# Patient Record
Sex: FEMALE | Race: WHITE | Employment: UNEMPLOYED | URBAN - METROPOLITAN AREA
[De-identification: names, ages, dates, MRNs, and addresses within clinical notes are randomized per-mention and may not be internally consistent; named-entity substitution may affect disease eponyms.]

---

## 2018-03-02 ENCOUNTER — OFFICE VISIT (OUTPATIENT)
Dept: NEUROLOGY | Age: 58
End: 2018-03-02

## 2018-03-02 VITALS
WEIGHT: 156 LBS | HEART RATE: 73 BPM | BODY MASS INDEX: 29.45 KG/M2 | HEIGHT: 61 IN | DIASTOLIC BLOOD PRESSURE: 78 MMHG | SYSTOLIC BLOOD PRESSURE: 130 MMHG | OXYGEN SATURATION: 98 %

## 2018-03-02 DIAGNOSIS — Q07.9 CONGENITAL ENCEPHALOPATHY (HCC): ICD-10-CM

## 2018-03-02 DIAGNOSIS — G40.309 NONINTRACTABLE GENERALIZED IDIOPATHIC EPILEPSY WITHOUT STATUS EPILEPTICUS (HCC): Primary | ICD-10-CM

## 2018-03-02 RX ORDER — PRIMIDONE 50 MG/1
TABLET ORAL
Qty: 60 TAB | Refills: 5 | Status: SHIPPED | OUTPATIENT
Start: 2018-03-02 | End: 2018-09-07 | Stop reason: SDUPTHER

## 2018-03-02 RX ORDER — CARBAMAZEPINE 200 MG/1
800 TABLET ORAL DAILY
Qty: 120 TAB | Refills: 5 | Status: SHIPPED | OUTPATIENT
Start: 2018-03-02 | End: 2018-03-05 | Stop reason: ALTCHOICE

## 2018-03-02 NOTE — PROGRESS NOTES
HISTORY OF PRESENT ILLNESS  Joy Frankel is a 65 y.o. female. HPI Comments: This patient is a 60-year-old [facility] American female with epilepsy. She will have about 5-6 seizures per month. She has congenital static encephalopathy. She lives with her mother. She was previously seeing a physician in [facility] but was driving several hours to get there with her mother. The physician there said that she ought to get neurological follow-up in [place]. She has had seizures since the age of 7 months. These by history sound like atonic seizures. She is currently taking primidone 50 mg twice daily and Tegretol 200 mg 2 twice daily. She is otherwise healthy. Seizure    The history is provided by the patient and relative (Mother). This is a chronic problem. Review of Systems   Constitutional:        Review of systems is positive for constipation, cough, falls, fatigue, snoring and poor vision. Complete review of systems done all others negative     Current Outpatient Prescriptions on File Prior to Visit   Medication Sig Dispense Refill    primidone (MYSOLINE) 250 mg tablet Take 250 mg by mouth three (3) times daily.  ibuprofen (MOTRIN) 600 mg tablet Take 1 Tab by mouth every six (6) hours as needed for Pain. 40 Tab 0    HYDROcodone-acetaminophen (NORCO) 5-325 mg per tablet Take 1 Tab by mouth every four (4) hours as needed for Pain. Max Daily Amount: 6 Tabs. 20 Tab 0     No current facility-administered medications on file prior to visit.       Past Medical History:   Diagnosis Date    Seizures ([facility] [address]      Family History   Problem Relation Age of Onset    No Known Problems Mother     No Known Problems Father         Social History   Substance Use Topics    Smoking status: Never Smoker    Smokeless tobacco: Never Used    Alcohol use No        /78  Pulse 73  Ht 5' 1\" (1.549 m)  Wt 156 lb (70.8 kg)  SpO2 98%  BMI 29.48 kg/m2    Physical Exam  Constitutional: Oriented to person, place, and time, appears well-developed and well-nourished. No distress. HENT:   Head: Normocephalic and atraumatic. Mouth/Throat: Oropharynx is clear and moist. No oropharyngeal exudate. Eyes: Conjunctivae and EOM are normal. Pupils are equal, round, and reactive to light. No scleral icterus. Neck: Normal range of motion. Neck supple. No thyromegaly present. Cardiovascular: Normal rate, regular rhythm and normal heart sounds. Musculoskeletal: Normal range of motion, exhibits no edema, tenderness or deformity. Lymphadenopathy: no cervical adenopathy. Neurological: Alert and oriented to person   Normal strength and normal reflexes. Displays no atrophy and no tremor. No cranial nerve deficit or sensory deficit. Exhibits normal muscle tone. Displays a negative Romberg sign, no seizure activity. Coordination normal, gait normal.   No Babinski's sign on the right side. No Babinski's sign on the left side. Speech is sparse but normal.  Visual fields are full to confrontation, funduscopic exam reveals flat discs, the retina and vasculature are normal   Skin: Skin is warm and dry. No rash noted, not diaphoretic. No erythema. Psychiatric: Normal mood and affect,  behavior is normal.  Vitals reviewed. ASSESSMENT and PLAN  EPILEPSY  I will not alter her medications at this time. She has more seizures per month and I am comfortable with but apparently the mother is comfortable with them. I will check a primidone level and Tegretol level today. I refilled her medications. I will make a follow-up appointment for 6 months but I asked the mother to call us in 3 days so that we make her adjust her medication as needed. CONGENITAL STATIC ENCEPHALOPATHY  Stable, no investigation required. This note will not be viewable in [address] This note was created using voice recognition software. Despite editing, there may be syntax errors.

## 2018-03-02 NOTE — MR AVS SNAPSHOT
[address], 
[address], [address] [facility] 
[phone] Patient: Pinky Ledesma MRN: [id number] LTL:[id number] Visit Information Date & Time Provider Department Dept. Phone Encounter #  
 3/2/2018  3:00 PM Geri Grissom MD Neurology Clinic at [facility] [phone] [id number] Follow-up Instructions Return in about 6 months (around 9/2/2018). Your Appointments 3/2/2018  3:00 PM  
New Patient with Geri Grissom MD  
[facility] Appt Note: NP Appointment, Seizures, Referred by Dr. Karena Overton [phone], AB, 02/14/18  
 [address] [place] [address], 
[address], [address] [address]  
[address],  [address] [address]  
  
    
 9/7/2018  2:40 PM  
Follow Up with Geri Grissom MD  
Neurology Clinic at [facility]) Appt Note: follow up seizures $ 0 cp jll 3/2/18  
 [address] [place] [address], [address] [address]  
[address], [address] [place] [address] [address] [address] Upcoming Health Maintenance Date Due Hepatitis C Screening 1960 DTaP/Tdap/Td series (1 - Tdap) 4/24/1982 PAP AKA CERVICAL CYTOLOGY 4/24/1982 BREAST CANCER SCRN MAMMOGRAM 4/24/2011 FOBT Q 1 YEAR AGE 50-75 4/24/2011 Influenza Age 5 to Adult 8/1/2017 Allergies as of 3/2/2018  Review Complete On: 3/2/2018 By: Caitlin Darling LPN No Known Allergies Current Immunizations  Never Reviewed No immunizations on file. Not reviewed this visit You Were Diagnosed With   
  
 Codes Comments Nonintractable generalized idiopathic epilepsy without status epilepticus ([facility]    -  Primary ICD-10-CM: E28.591 ICD-9-CM: 345.90 Vitals BP Pulse Height(growth percentile) Weight(growth percentile) SpO2 BMI  
 130/78 73 5' 1\" (1.549 m) 156 lb (70.8 kg) 98% 29.48 kg/m2 OB Status Smoking Status Postmenopausal Never Smoker Vitals History BMI and BSA Data Body Mass Index Body Surface Area  
 29.48 kg/m 2 1.75 m 2 Preferred Pharmacy Pharmacy Name Phone [id number] [address] [facility] [phone] Your Updated Medication List  
  
   
This list is accurate as of 3/2/18  2:13 PM.  Always use your most recent med list.  
  
  
  
  
 carBAMazepine 200 mg tablet Commonly known as:  TEGretol Take 4 Tabs by mouth daily. Indications: MIXED EPILEPSY HYDROcodone-acetaminophen 5-325 mg per tablet Commonly known as:  Kay Zarate Take 1 Tab by mouth every four (4) hours as needed for Pain. Max Daily Amount: 6 Tabs. ibuprofen 600 mg tablet Commonly known as:  MOTRIN Take 1 Tab by mouth every six (6) hours as needed for Pain. * primidone 250 mg tablet Commonly known as: MYSOLINE Take 250 mg by mouth three (3) times daily. * primidone 50 mg tablet Commonly known as: MYSOLINE  
1 twice daily  Indications: epilepsy * Notice: This list has 2 medication(s) that are the same as other medications prescribed for you. Read the directions carefully, and ask your doctor or other care provider to review them with you. Prescriptions Sent to Pharmacy Refills  
 carBAMazepine (TEGRETOL) 200 mg tablet 5 Sig: Take 4 Tabs by mouth daily. Indications: MIXED EPILEPSY Class: Normal  
 Pharmacy: [address] [place] [address] Ph #: [phone] Route: Oral  
 primidone (MYSOLINE) 50 mg tablet 5 Si twice daily  Indications: epilepsy Class: Normal  
 Pharmacy: [address] [place] [address] Ph #: [phone] We Performed the Following CARBAMAZEPINE [id number] CPT(R)] PRIMIDONE (MYSOLINE) [id number] CPT(R)] Follow-up Instructions Return in about 6 months (around 9/2/2018). Patient Instructions PRESCRIPTION REFILL POLICY [facility] Statement to Patients April 1, 2014 In an effort to ensure the large volume of patient prescription refills is processed in the most efficient and expeditious manner, we are asking our patients to assist us by calling your Pharmacy for all prescription refills, this will include also your  Mail Order Pharmacy. The pharmacy will contact our office electronically to continue the refill process. Please do not wait until the last minute to call your pharmacy. We need at least 48 hours (2days) to fill prescriptions. We also encourage you to call your pharmacy before going to  your prescription to make sure it is ready. With regard to controlled substance prescription refill requests (narcotic refills) that need to be picked up at our office, we ask your cooperation by providing us with at least 72 hours (3days) notice that you will need a refill. We will not refill narcotic prescription refill requests after 4:00pm on any weekday, Monday through Thursday, or after 2:00pm on Fridays, or on the weekends. We encourage everyone to explore another way of getting your prescription refill request processed using [employer], our patient web portal through our electronic medical record system. [employer] is an efficient and effective way to communicate your medication request directly to the office and  downloadable as an frank on your smart phone . [employer] also features a review functionality that allows you to view your medication list as well as leave messages for your physician. Are you ready to get connected? If so please review the attatched instructions or speak to any of our staff to get you set up right away! Thank you so much for your cooperation. Should you have any questions please contact our Practice Administrator. The Physicians and Staff,  [facility] Introducing hospitals & HEALTH SERVICES! [facility] introduces MOO.COM patient portal. Now you can access parts of your medical record, email your doctor's office, and request medication refills online. 1. In your internet browser, go to [email or web address] 2. Click on the First Time User? Click Here link in the Sign In box. You will see the New Member Sign Up page. 3. Enter your [employer] Access Code exactly as it appears below. You will not need to use this code after youve completed the sign-up process. If you do not sign up before the expiration date, you must request a new code. · MOO.COM Access Code: [email or web address] Expires: 5/31/2018  1:25 PM 
 
4. Enter the last four digits of your Social Security Number (xxxx) and Date of Birth (mm/dd/yyyy) as indicated and click Submit. You will be taken to the next sign-up page. 5. Create a MOO.COM ID. This will be your MOO.COM login ID and cannot be changed, so think of one that is secure and easy to remember. 6. Create a MOO.COM password. You can change your password at any time. 7. Enter your Password Reset Question and Answer. This can be used at a later time if you forget your password. 8. Enter your e-mail address. You will receive e-mail notification when new information is available in [address] 9. Click Sign Up. You can now view and download portions of your medical record. 10. Click the Download Summary menu link to download a portable copy of your medical information. If you have questions, please visit the Frequently Asked Questions section of the MOO.COM website. Remember, MOO.COM is NOT to be used for urgent needs. For medical emergencies, dial 911. Now available from your iPhone and Android! Please provide this summary of care documentation to your next provider. Your primary care clinician is listed as Lynnette Peguero. If you have any questions after today's visit, please call [phone].

## 2018-03-02 NOTE — PATIENT INSTRUCTIONS
[facility]   Statement to Patients  April 1, 2014      In an effort to ensure the large volume of patient prescription refills is processed in the most efficient and expeditious manner, we are asking our patients to assist us by calling your Pharmacy for all prescription refills, this will include also your  Mail Order Pharmacy. The pharmacy will contact our office electronically to continue the refill process. Please do not wait until the last minute to call your pharmacy. We need at least 48 hours (2days) to fill prescriptions. We also encourage you to call your pharmacy before going to  your prescription to make sure it is ready. With regard to controlled substance prescription refill requests (narcotic refills) that need to be picked up at our office, we ask your cooperation by providing us with at least 72 hours (3days) notice that you will need a refill. We will not refill narcotic prescription refill requests after 4:00pm on any weekday, Monday through Thursday, or after 2:00pm on Fridays, or on the weekends. We encourage everyone to explore another way of getting your prescription refill request processed using [employer], our patient web portal through our electronic medical record system. [employer] is an efficient and effective way to communicate your medication request directly to the office and  downloadable as an frank on your smart phone . [employer] also features a review functionality that allows you to view your medication list as well as leave messages for your physician. Are you ready to get connected? If so please review the attatched instructions or speak to any of our staff to get you set up right away! Thank you so much for your cooperation. Should you have any questions please contact our Practice Administrator.     The Physicians and Staff,  [facility]

## 2018-03-02 NOTE — LETTER
3/2/2018 3:27 PM 
 
Patient:  Nacho Dasilva YOB: 1960 Date of Visit: 3/2/2018 Dear Estrellita Boston MD 
[address] [facility] [address] [address] VIA Facsimile: [phone] 
 : Thank you for referring Ms. Nacho Dasilva to me for evaluation/treatment. Below are the relevant portions of my assessment and plan of care. HISTORY OF PRESENT ILLNESS Nacho Dasilva is a 65 y.o. female. HPI Comments: This patient is a 27-year-old [facility] American female with epilepsy. She will have about 5-6 seizures per month. She has congenital static encephalopathy. She lives with her mother. She was previously seeing a physician in [facility] but was driving several hours to get there with her mother. The physician there said that she ought to get neurological follow-up in [place]. She has had seizures since the age of 7 months. These by history sound like atonic seizures. She is currently taking primidone 50 mg twice daily and Tegretol 200 mg 2 twice daily. She is otherwise healthy. Seizure The history is provided by the patient and relative (Mother). This is a chronic problem. Review of Systems Constitutional:  
     Review of systems is positive for constipation, cough, falls, fatigue, snoring and poor vision. Complete review of systems done all others negative Current Outpatient Prescriptions on File Prior to Visit Medication Sig Dispense Refill  primidone (MYSOLINE) 250 mg tablet Take 250 mg by mouth three (3) times daily.  ibuprofen (MOTRIN) 600 mg tablet Take 1 Tab by mouth every six (6) hours as needed for Pain. 40 Tab 0  
 HYDROcodone-acetaminophen (NORCO) 5-325 mg per tablet Take 1 Tab by mouth every four (4) hours as needed for Pain. Max Daily Amount: 6 Tabs. 20 Tab 0 No current facility-administered medications on file prior to visit. Past Medical History:  
Diagnosis Date  Seizures ([address] Family History Problem Relation Age of Onset  No Known Problems Mother  No Known Problems Father Social History Substance Use Topics  Smoking status: Never Smoker  Smokeless tobacco: Never Used  Alcohol use No  
 
 
 /78  Pulse 73  Ht 5' 1\" (1.549 m)  Wt 156 lb (70.8 kg)  SpO2 98%  BMI 29.48 kg/m2 Physical Exam 
Constitutional: Oriented to person, place, and time, appears well-developed and well-nourished. No distress. HENT:  
Head: Normocephalic and atraumatic. Mouth/Throat: Oropharynx is clear and moist. No oropharyngeal exudate. Eyes: Conjunctivae and EOM are normal. Pupils are equal, round, and reactive to light. No scleral icterus. Neck: Normal range of motion. Neck supple. No thyromegaly present. Cardiovascular: Normal rate, regular rhythm and normal heart sounds. Musculoskeletal: Normal range of motion, exhibits no edema, tenderness or deformity. Lymphadenopathy: no cervical adenopathy. Neurological: Alert and oriented to person Normal strength and normal reflexes. Displays no atrophy and no tremor. No cranial nerve deficit or sensory deficit. Exhibits normal muscle tone. Displays a negative Romberg sign, no seizure activity. Coordination normal, gait normal.  
No Babinski's sign on the right side. No Babinski's sign on the left side. Speech is sparse but normal. 
Visual fields are full to confrontation, funduscopic exam reveals flat discs, the retina and vasculature are normal  
Skin: Skin is warm and dry. No rash noted, not diaphoretic. No erythema. Psychiatric: Normal mood and affect,  behavior is normal. 
Vitals reviewed. ASSESSMENT and PLAN 
EPILEPSY I will not alter her medications at this time. She has more seizures per month and I am comfortable with but apparently the mother is comfortable with them. I will check a primidone level and Tegretol level today. I refilled her medications.   I will make a follow-up appointment for 6 months but I asked the mother to call us in 3 days so that we make her adjust her medication as needed. CONGENITAL STATIC ENCEPHALOPATHY Stable, no investigation required. This note will not be viewable in [address] This note was created using voice recognition software. Despite editing, there may be syntax errors. If you have questions, please do not hesitate to call me. I look forward to following Ms. Maulik Ramirezcharanjit along with you. Sincerely, Lisa Ko MD

## 2018-03-05 ENCOUNTER — TELEPHONE (OUTPATIENT)
Dept: NEUROLOGY | Age: 58
End: 2018-03-05

## 2018-03-05 RX ORDER — CARBAMAZEPINE 200 MG/1
200 TABLET ORAL 3 TIMES DAILY
Status: CANCELLED | OUTPATIENT
Start: 2018-03-05

## 2018-03-05 RX ORDER — CARBAMAZEPINE 200 MG/1
200 TABLET ORAL 3 TIMES DAILY
Qty: 120 TAB | Refills: 5 | Status: SHIPPED | OUTPATIENT
Start: 2018-03-05 | End: 2018-11-28 | Stop reason: SDUPTHER

## 2018-03-05 NOTE — TELEPHONE ENCOUNTER
----- Message from Jessica Witt MD sent at 3/5/2018 10:43 AM EST -----  Excellent level, continue current dose

## 2018-03-06 LAB
CARBAMAZEPINE SERPL-MCNC: 8.6 UG/ML (ref 4–12)
PHENOBARB SERPL-MCNC: 9 UG/ML (ref 15–40)
PRIMIDONE SERPL-MCNC: 5.4 UG/ML (ref 5–12)

## 2018-03-07 NOTE — PROGRESS NOTES
Can you call this patient's mother and tell her that the primidone level is a bit low and if we want to try for better seizure control we can increase her primidone some. There is a small chance she would get sleepy on the increased dose but I think it is worth a try. If she is willing we would increase the primidone to 1-1/2 tablets twice a day, she is taking 1 tablet twice a day now of the 50 mg.   If she wishes to go ahead with this let me know and I will alter the prescription

## 2018-05-17 ENCOUNTER — HOSPITAL ENCOUNTER (OUTPATIENT)
Dept: CT IMAGING | Age: 58
Discharge: HOME | End: 2018-05-17
Attending: UROLOGY
Payer: MEDICARE

## 2018-05-17 DIAGNOSIS — R31.21 ASYMPTOMATIC MICROSCOPIC HEMATURIA: ICD-10-CM

## 2018-05-17 PROCEDURE — 74011250636 HC RX REV CODE- 250/636: Performed by: UROLOGY

## 2018-05-17 PROCEDURE — 74011636320 HC RX REV CODE- 636/320: Performed by: UROLOGY

## 2018-05-17 PROCEDURE — 74178 CT ABD&PLV WO CNTR FLWD CNTR: CPT

## 2018-05-17 RX ORDER — SODIUM CHLORIDE 9 MG/ML
50 INJECTION, SOLUTION INTRAVENOUS
Status: COMPLETED | OUTPATIENT
Start: 2018-05-17 | End: 2018-05-17

## 2018-05-17 RX ORDER — SODIUM CHLORIDE 0.9 % (FLUSH) 0.9 %
10 SYRINGE (ML) INJECTION
Status: COMPLETED | OUTPATIENT
Start: 2018-05-17 | End: 2018-05-17

## 2018-05-17 RX ADMIN — Medication 10 ML: at 15:50

## 2018-05-17 RX ADMIN — IOPAMIDOL 100 ML: 755 INJECTION, SOLUTION INTRAVENOUS at 15:51

## 2018-05-17 RX ADMIN — SODIUM CHLORIDE 50 ML/HR: 900 INJECTION, SOLUTION INTRAVENOUS at 15:51

## 2018-08-03 RX ORDER — PRIMIDONE 250 MG/1
TABLET ORAL
Qty: 90 TAB | Refills: 10 | Status: SHIPPED | OUTPATIENT
Start: 2018-08-03 | End: 2018-09-07 | Stop reason: SDUPTHER

## 2018-09-07 ENCOUNTER — OFFICE VISIT (OUTPATIENT)
Dept: NEUROLOGY | Age: 58
End: 2018-09-07

## 2018-09-07 VITALS
BODY MASS INDEX: 29.45 KG/M2 | DIASTOLIC BLOOD PRESSURE: 80 MMHG | HEART RATE: 82 BPM | HEIGHT: 61 IN | SYSTOLIC BLOOD PRESSURE: 126 MMHG | WEIGHT: 156 LBS

## 2018-09-07 DIAGNOSIS — G40.309 NONINTRACTABLE GENERALIZED IDIOPATHIC EPILEPSY WITHOUT STATUS EPILEPTICUS (HCC): Primary | ICD-10-CM

## 2018-09-07 DIAGNOSIS — Q07.9 CONGENITAL ENCEPHALOPATHY (HCC): ICD-10-CM

## 2018-09-07 PROBLEM — G40.009 PARTIAL IDIOPATHIC EPILEPSY WITH SEIZURES OF LOCALIZED ONSET, NOT INTRACTABLE, WITHOUT STATUS EPILEPTICUS (HCC): Status: ACTIVE | Noted: 2018-09-07

## 2018-09-07 RX ORDER — PRIMIDONE 250 MG/1
TABLET ORAL
Qty: 90 TAB | Refills: 10 | Status: SHIPPED | OUTPATIENT
Start: 2018-09-07 | End: 2019-05-03 | Stop reason: SDUPTHER

## 2018-09-07 RX ORDER — PRIMIDONE 50 MG/1
TABLET ORAL
Qty: 90 TAB | Refills: 5 | Status: SHIPPED | OUTPATIENT
Start: 2018-09-07 | End: 2019-05-03 | Stop reason: SDUPTHER

## 2018-09-07 NOTE — MR AVS SNAPSHOT
[address] [facility], 
[address], [address] [facility] 
[phone] Patient: Alfredito Garsia MRN: [id number] Visit Information Date & Time Provider Department Dept. Phone Encounter #  
 2018  2:40 PM Estela Rudd MD Neurology [facility] at [facility] [phone] [id number] Follow-up Instructions Return in about 6 months (around 3/7/2019). Your Appointments 3/8/2019  1:40 PM  
Follow Up with Estela Rudd MD  
Neurology Clinic at [facility] Appt Note: follow up 4 0 CP jll 18  
 [address] [place] [address], 
[address], [phone] [phone]  
[address] [facility], [address] [facility], [address] [phone] Upcoming Health Maintenance Date Due Hepatitis C Screening 1960 DTaP/Tdap/Td series (1 - Tdap) 1982 PAP AKA CERVICAL CYTOLOGY 1982 BREAST CANCER SCRN MAMMOGRAM 2011 FOBT Q 1 YEAR AGE 50-75 2011 MEDICARE YEARLY EXAM 3/14/2018 Influenza Age 5 to Adult 2018 Allergies as of 2018  Review Complete On: 2018 By: Chon Thorpe LPN No Known Allergies Current Immunizations  Never Reviewed No immunizations on file. Not reviewed this visit You Were Diagnosed With   
  
 Codes Comments Nonintractable generalized idiopathic epilepsy without status epilepticus ([facility]    -  Primary ICD-10-CM: F49.433 ICD-9-CM: 345.90 Vitals BP Pulse Height(growth percentile) Weight(growth percentile) BMI OB Status 126/80 82 5' 1\" (1.549 m) 156 lb (70.8 kg) 29.48 kg/m2 Postmenopausal  
 Smoking Status Never Smoker Vitals History BMI and BSA Data Body Mass Index Body Surface Area  
 29.48 kg/m 2 1.75 m 2 Preferred Pharmacy Pharmacy Name Phone [id number] [place], [address] [phone] Your Updated Medication List  
  
   
This list is accurate as of 18  3:01 PM.  Always use your most recent med list.  
  
  
  
  
 carBAMazepine 200 mg tablet Commonly known as:  TEGretol Take 1 Tab by mouth three (3) times daily. Indications: Brand Tegretol only HYDROcodone-acetaminophen 5-325 mg per tablet Commonly known as:  Benetta Pock Take 1 Tab by mouth every four (4) hours as needed for Pain. Max Daily Amount: 6 Tabs. ibuprofen 600 mg tablet Commonly known as:  MOTRIN Take 1 Tab by mouth every six (6) hours as needed for Pain. * primidone 250 mg tablet Commonly known as: MYSOLINE  
TAKE 1 TABLET BY MOUTH 3 TIMES A DAY  Indications: epilepsy * primidone 50 mg tablet Commonly known as: MYSOLINE  
1 twice daily  Indications: epilepsy * Notice: This list has 2 medication(s) that are the same as other medications prescribed for you. Read the directions carefully, and ask your doctor or other care provider to review them with you. Prescriptions Sent to Pharmacy Refills  
 primidone (MYSOLINE) 250 mg tablet 10 Sig: TAKE 1 TABLET BY MOUTH 3 TIMES A DAY  Indications: epilepsy Class: Normal  
 Pharmacy: [address] [place] [address] Ph #: [phone]  
 primidone (MYSOLINE) 50 mg tablet 5 Si twice daily  Indications: epilepsy Class: Normal  
 Pharmacy: [address] [place] [address] Ph #: [phone] We Performed the Following PRIMIDONE (MYSOLINE) [id number] CPT(R)] Follow-up Instructions Return in about 6 months (around 3/7/2019). Patient Instructions Office Policies 
o Phone calls/patient messages: Please allow up to 24 hours for someone in the office to contact you about your call or message.  Be mindful your provider may be out of the office or your message may require further review. We encourage you to use [employer] for your messages as this is a faster, more efficient way to communicate with our office 
o Medication Refills: 
Prescription medications require up to 48 business hours to process. We encourage you to use [employer] for your refills. For controlled medications: Please allow up to 72 business hours to process. Certain medications may require you to  a written prescription at our office. NO narcotic/controlled medications will be prescribed after 4pm Monday through Friday or on weekends 
o Form/Paperwork Completion: 
Please note there is a $25 fee for all paperwork completed by our providers. We ask that you allow 7-14 business days. Pre-payment is due prior to picking up/faxing the completed form. You may also download your forms to [employer] to have your doctor print off. 
o Test Results: In order for a patient to obtain test results, an appointment must be made with the physician to review the results. Test results cannot be discussed over the phone. Introducing [facility]! [facility] introduces [employer] patient portal. Now you can access parts of your medical record, email your doctor's office, and request medication refills online. 1. In your internet browser, go to [email or web address] 2. Click on the First Time User? Click Here link in the Sign In box. You will see the New Member Sign Up page. 3. Enter your [employer] Access Code exactly as it appears below. You will not need to use this code after youve completed the sign-up process. If you do not sign up before the expiration date, you must request a new code. · [employer] Access Code: [email or web address] Expires: 12/6/2018  3:01 PM 
 
4. Enter the last four digits of your Social Security Number (xxxx) and Date of Birth (mm/dd/yyyy) as indicated and click Submit. You will be taken to the next sign-up page. 5. Create a [employer]XTM ID. This will be your [employer] login ID and cannot be changed, so think of one that is secure and easy to remember. 6. Create a [employer] password. You can change your password at any time. 7. Enter your Password Reset Question and Answer. This can be used at a later time if you forget your password. 8. Enter your e-mail address. You will receive e-mail notification when new information is available in [address] 9. Click Sign Up. You can now view and download portions of your medical record. 10. Click the Download Summary menu link to download a portable copy of your medical information. If you have questions, please visit the Frequently Asked Questions section of the [employer] website. Remember, [employer] is NOT to be used for urgent needs. For medical emergencies, dial 911. Now available from your iPhone and Android! Please provide this summary of care documentation to your next provider. Your primary care clinician is listed as Divine Castro. If you have any questions after today's visit, please call [phone].

## 2018-09-07 NOTE — PROGRESS NOTES
HISTORY OF PRESENT ILLNESS  Roberta Doss is a 63 y.o. female. HPI Comments: This patient is a 73-year-old [facility] American female with epilepsy. She will have about 5-6 seizures per month. She has congenital static encephalopathy. She lives with her mother. She was previously seeing a physician in [facility] but was driving several hours to get there with her mother. The physician there said that she ought to get neurological follow-up in [place]. She has had seizures since the age of 7 months. These by history sound like atonic seizures. She is currently taking primidone 50 mg twice daily and Tegretol 200 mg 2 twice daily. She is otherwise healthy. Her mother says that she is doing well since we increased the phenobarbital.  She is currently taking twice a day as noted above. .  The mother says she still several times a month will have 1 of her seizures which often consists of contortions of the upper body with the arms rising and the head turning and noise. Patient does not generalized and does not lose consciousness. Review of Systems   Constitutional:        Review of systems is positive for constipation, cough, falls, fatigue, snoring and poor vision. Complete review of systems done all others negative     Current Outpatient Prescriptions on File Prior to Visit   Medication Sig Dispense Refill    carBAMazepine (TEGRETOL) 200 mg tablet Take 1 Tab by mouth three (3) times daily. Indications: Brand Tegretol only 120 Tab 5    ibuprofen (MOTRIN) 600 mg tablet Take 1 Tab by mouth every six (6) hours as needed for Pain. 40 Tab 0    HYDROcodone-acetaminophen (NORCO) 5-325 mg per tablet Take 1 Tab by mouth every four (4) hours as needed for Pain. Max Daily Amount: 6 Tabs. 20 Tab 0     No current facility-administered medications on file prior to visit.       Past Medical History:   Diagnosis Date    Seizures ([facility]      Family History   Problem Relation Age of Onset    No Known Problems Mother     No Known Problems Father         Social History   Substance Use Topics    Smoking status: Never Smoker    Smokeless tobacco: Never Used    Alcohol use No        /80  Pulse 82  Ht 5' 1\" (1.549 m)  Wt 156 lb (70.8 kg)  BMI 29.48 kg/m2    Physical Exam  Constitutional: Oriented to person, place,   Neurological: Alert and oriented to person   Displays no atrophy and no tremor. Exhibits normal muscle tone. Displays a negative Romberg sign, no seizure activity. Coordination normal, gait normal.   Vitals reviewed. 3/6/2018  4:38 PM - Alfonso, [facility] Lab Results In   Component Results   Component Value Flag Ref Range Units Status   Primidone, Serum 5.4  5.0 - 12.0 ug/mL Final   Comment:                                   Detection Limit = 0.3                            <0.3 indicates None Detected      Phenobarbital 9 (L) 15 - 40 ug/mL Final   Comment:                                   Detection Limit = 3     3/6/2018  4:38 PM - Alfonso, [facility] Lab Results In   Component Results   Component Value Flag Ref Range Units Status   Carbamazepine 8.6  4.0 - 12.0 ug/mL Final       ASSESSMENT and PLAN  EPILEPSY  Phenobarbital is still little bit low. She is taking primidone 250 mg 3 times daily and primidone 50 mg 3 times daily. The levels above represent when she was taking the 250 3 times daily and the 50 twice daily. That was 2 weeks ago. I will check levels again today, hopefully her barbital level will be in the therapeutic range, if not I will continue to increase until it is. I will see her back in 6 months. CONGENITAL STATIC ENCEPHALOPATHY  Stable, no investigation required.

## 2018-09-07 NOTE — PATIENT INSTRUCTIONS
Office Policies  o Phone calls/patient messages:  Please allow up to 24 hours for someone in the office to contact you about your call or message. Be mindful your provider may be out of the office or your message may require further review. We encourage you to use [employer] for your messages as this is a faster, more efficient way to communicate with our office  o Medication Refills:  Prescription medications require up to 48 business hours to process. We encourage you to use [employer] for your refills. For controlled medications: Please allow up to 72 business hours to process. Certain medications may require you to  a written prescription at our office. NO narcotic/controlled medications will be prescribed after 4pm Monday through Friday or on weekends  o Form/Paperwork Completion:  Please note there is a $25 fee for all paperwork completed by our providers. We ask that you allow 7-14 business days. Pre-payment is due prior to picking up/faxing the completed form. You may also download your forms to [employer] to have your doctor print off.  o Test Results: In order for a patient to obtain test results, an appointment must be made with the physician to review the results. Test results cannot be discussed over the phone.

## 2018-09-11 LAB
PHENOBARB SERPL-MCNC: 30 UG/ML (ref 15–40)
PRIMIDONE SERPL-MCNC: 12.2 UG/ML (ref 5–12)

## 2018-11-28 ENCOUNTER — TELEPHONE (OUTPATIENT)
Dept: NEUROLOGY | Age: 58
End: 2018-11-28

## 2018-11-28 RX ORDER — CARBAMAZEPINE 200 MG/1
200 TABLET ORAL 3 TIMES DAILY
Qty: 120 TAB | Refills: 5 | Status: SHIPPED | OUTPATIENT
Start: 2018-11-28 | End: 2018-11-29 | Stop reason: SDUPTHER

## 2018-11-28 NOTE — TELEPHONE ENCOUNTER
Received a call from the pharmacy they stated the patient cannot take the generic of the carBAMazepine (TEGRETOL) 200 mg tablet [[id number]]   Order Details       Wanted to know if the medication can be re sent indicating brand only

## 2018-11-29 RX ORDER — CARBAMAZEPINE 200 MG/1
200 TABLET ORAL 3 TIMES DAILY
Qty: 270 TAB | Refills: 3 | Status: SHIPPED | OUTPATIENT
Start: 2018-11-29 | End: 2019-03-25

## 2019-03-20 ENCOUNTER — TELEPHONE (OUTPATIENT)
Dept: NEUROLOGY | Age: 59
End: 2019-03-20

## 2019-03-25 ENCOUNTER — TELEPHONE (OUTPATIENT)
Dept: NEUROLOGY | Age: 59
End: 2019-03-25

## 2019-03-25 RX ORDER — CARBAMAZEPINE 200 MG/1
TABLET ORAL
Qty: 540 TAB | Refills: 3 | Status: SHIPPED | OUTPATIENT
Start: 2019-03-25 | End: 2019-04-04

## 2019-03-25 NOTE — TELEPHONE ENCOUNTER
Note      Spoke to sister and patient had started having seizures again after being changed to carbamazepine 200 mg take 1 tab three times daily   Patient went back to Carbamazepine 200 mg take 2 tabs three times daily  Dr. Jose Sewell please write new script with directions   And d/c old script   Thanks.

## 2019-03-25 NOTE — TELEPHONE ENCOUNTER
Called in regards to the medication and needs to know how much to take/ She is having seizures again.

## 2019-04-02 ENCOUNTER — TELEPHONE (OUTPATIENT)
Dept: NEUROLOGY | Age: 59
End: 2019-04-02

## 2019-04-04 ENCOUNTER — TELEPHONE (OUTPATIENT)
Dept: NEUROLOGY | Age: 59
End: 2019-04-04

## 2019-04-04 RX ORDER — CARBAMAZEPINE 200 MG/1
TABLET ORAL
Qty: 540 TAB | Refills: 3 | Status: SHIPPED | OUTPATIENT
Start: 2019-04-04 | End: 2019-05-03 | Stop reason: SDUPTHER

## 2019-04-04 NOTE — TELEPHONE ENCOUNTER
Re: Tegretol (brand medically necessary)     Qty requested: 540/90 days     Sent to [employer] [facility] via [employer]. Status is pending.   Key: [id number] - [id number]

## 2019-04-04 NOTE — TELEPHONE ENCOUNTER
Tegretol approval  From [facility] ID [id number] 3 1/19 - 4/3/20. Faxed to [facility] on [address].

## 2019-05-03 ENCOUNTER — OFFICE VISIT (OUTPATIENT)
Dept: NEUROLOGY | Age: 59
End: 2019-05-03

## 2019-05-03 VITALS
OXYGEN SATURATION: 98 % | WEIGHT: 159 LBS | HEIGHT: 61 IN | HEART RATE: 77 BPM | BODY MASS INDEX: 30.02 KG/M2 | SYSTOLIC BLOOD PRESSURE: 128 MMHG | DIASTOLIC BLOOD PRESSURE: 80 MMHG

## 2019-05-03 DIAGNOSIS — G40.309 NONINTRACTABLE GENERALIZED IDIOPATHIC EPILEPSY WITHOUT STATUS EPILEPTICUS (HCC): Primary | ICD-10-CM

## 2019-05-03 RX ORDER — CARBAMAZEPINE 200 MG/1
TABLET ORAL
Qty: 540 TAB | Refills: 3 | Status: SHIPPED | OUTPATIENT
Start: 2019-05-03 | End: 2020-04-22 | Stop reason: SDUPTHER

## 2019-05-03 RX ORDER — PRIMIDONE 50 MG/1
TABLET ORAL
Qty: 90 TAB | Refills: 5 | Status: SHIPPED | OUTPATIENT
Start: 2019-05-03 | End: 2020-02-03

## 2019-05-03 RX ORDER — PRIMIDONE 250 MG/1
TABLET ORAL
Qty: 90 TAB | Refills: 10 | Status: SHIPPED | OUTPATIENT
Start: 2019-05-03 | End: 2020-02-03 | Stop reason: SDUPTHER

## 2019-05-03 NOTE — PATIENT INSTRUCTIONS
A Healthy Lifestyle: Care Instructions  Your Care Instructions    A healthy lifestyle can help you feel good, stay at a healthy weight, and have plenty of energy for both work and play. A healthy lifestyle is something you can share with your whole family. A healthy lifestyle also can lower your risk for serious health problems, such as high blood pressure, heart disease, and diabetes. You can follow a few steps listed below to improve your health and the health of your family. Follow-up care is a key part of your treatment and safety. Be sure to make and go to all appointments, and call your doctor if you are having problems. It's also a good idea to know your test results and keep a list of the medicines you take. How can you care for yourself at home? · Do not eat too much sugar, fat, or fast foods. You can still have dessert and treats now and then. The goal is moderation. · Start small to improve your eating habits. Pay attention to portion sizes, drink less juice and soda pop, and eat more fruits and vegetables. ? Eat a healthy amount of food. A 3-ounce serving of meat, for example, is about the size of a deck of cards. Fill the rest of your plate with vegetables and whole grains. ? Limit the amount of soda and sports drinks you have every day. Drink more water when you are thirsty. ? Eat at least 5 servings of fruits and vegetables every day. It may seem like a lot, but it is not hard to reach this goal. A serving or helping is 1 piece of fruit, 1 cup of vegetables, or 2 cups of leafy, raw vegetables. Have an apple or some carrot sticks as an afternoon snack instead of a candy bar. Try to have fruits and/or vegetables at every meal.  · Make exercise part of your daily routine. You may want to start with simple activities, such as walking, bicycling, or slow swimming. Try to be active 30 to 60 minutes every day. You do not need to do all 30 to 60 minutes all at once.  For example, you can exercise 3 times a day for 10 or 20 minutes. Moderate exercise is safe for most people, but it is always a good idea to talk to your doctor before starting an exercise program.  · Keep moving. Carrie Mcdaniele the lawn, work in the garden, or [employer]. Take the stairs instead of the elevator at work. · If you smoke, quit. People who smoke have an increased risk for heart attack, stroke, cancer, and other lung illnesses. Quitting is hard, but there are ways to boost your chance of quitting tobacco for good. ? Use nicotine gum, patches, or lozenges. ? Ask your doctor about stop-smoking programs and medicines. ? Keep trying. In addition to reducing your risk of diseases in the future, you will notice some benefits soon after you stop using tobacco. If you have shortness of breath or asthma symptoms, they will likely get better within a few weeks after you quit. · Limit how much alcohol you drink. Moderate amounts of alcohol (up to 2 drinks a day for men, 1 drink a day for women) are okay. But drinking too much can lead to liver problems, high blood pressure, and other health problems. Family health  If you have a family, there are many things you can do together to improve your health. · Eat meals together as a family as often as possible. · Eat healthy foods. This includes fruits, vegetables, lean meats and dairy, and whole grains. · Include your family in your fitness plan. Most people think of activities such as jogging or tennis as the way to fitness, but there are many ways you and your family can be more active. Anything that makes you breathe hard and gets your heart pumping is exercise. Here are some tips:  ? Walk to do errands or to take your child to school or the bus.  ? Go for a family bike ride after dinner instead of watching TV. Where can you learn more? Go to http://augustina-elise.info/. Enter U725 in the search box to learn more about \"A Healthy Lifestyle: Care Instructions. \"  Current as of: September 11, 2018  Content Version: 11.9  © 6400-0768 [employer]. Care instructions adapted under license by [employer] (which disclaims liability or warranty for this information). If you have questions about a medical condition or this instruction, always ask your healthcare professional. [address] any warranty or liability for your use of this information.

## 2019-05-03 NOTE — PROGRESS NOTES
HISTORY OF PRESENT ILLNESS  Bambi Alvarez is a 63 y.o. female. HPI Comments: This patient is a 61-year-old [facility] American female with epilepsy. She will have about 5-6 seizures per month. She has congenital static encephalopathy. She lives with her mother. She was previously seeing a physician in [facility] but was driving several hours to get there with her mother. The physician there said that she ought to get neurological follow-up in [address] [facility]. She has had seizures since the age of 7 months. These by history sound like atonic seizures. She is currently taking primidone 50 mg twice daily and Tegretol 200 mg 2 twice daily. She is otherwise healthy. She returns today, she is doing well and has not had any seizures since her last visit. Review of Systems   Constitutional:        Review of systems is positive for constipation, cough, falls, fatigue, snoring and poor vision. Complete review of systems done all others negative        Current Outpatient Medications:     carBAMazepine (TEGRETOL) 200 mg tablet, 2 po  Three times a day  Indications: Convulsive Seizures, Disp: 540 Tab, Rfl: 3    primidone (MYSOLINE) 250 mg tablet, TAKE 1 TABLET BY MOUTH 3 TIMES A DAY  Indications: epilepsy, Disp: 90 Tab, Rfl: 10    primidone (MYSOLINE) 50 mg tablet, 1 twice daily  Indications: epilepsy, Disp: 90 Tab, Rfl: 5    ibuprofen (MOTRIN) 600 mg tablet, Take 1 Tab by mouth every six (6) hours as needed for Pain., Disp: 40 Tab, Rfl: 0    HYDROcodone-acetaminophen (NORCO) 5-325 mg per tablet, Take 1 Tab by mouth every four (4) hours as needed for Pain.  Max Daily Amount: 6 Tabs., Disp: 20 Tab, Rfl: 0        No current facility-administered medications on file prior to visit.            Past Medical History:   Diagnosis Date    Seizures ([facility]              Family History   Problem Relation Age of Onset    No Known Problems Mother      No Known Problems Father                Social History   Substance Use Topics    Smoking status: Never Smoker    Smokeless tobacco: Never Used    Alcohol use No          /80  Pulse 82  Ht 5' 1\" (1.549 m)  Wt 156 lb (70.8 kg)  BMI 29.48 kg/m2     Physical Exam  Constitutional: Oriented to person, place,   Neurological: Alert and oriented to person   Displays no atrophy and no tremor. Exhibits normal muscle tone. Displays a negative Romberg sign, no seizure activity. Coordination normal, gait normal.   Vitals reviewed. 3/6/2018  4:38 PM - Alfonso, [facility] Lab Results In   Component Results   Component Value Flag Ref Range Units Status   Primidone, Serum 5.4   5.0 - 12.0 ug/mL Final   Comment:                                   Detection Limit = 0.3                            <0.3 indicates None Detected       Phenobarbital 9 (L) 15 - 40 ug/mL Final   Comment:                                   Detection Limit = 3      3/6/2018  4:38 PM - Alfonso, [facility] Lab Results In   Component Results   Component Value Flag Ref Range Units Status   Carbamazepine 8.6   4.0 - 12.0 ug/mL Final         ASSESSMENT and PLAN  EPILEPSY  She is seizure-free on the combination of Tegretol 200 mg 2 p.o. twice daily and Primidone 50 mg twice daily. Her gait is now a little unstable although I cannot notice that the mother sees her fall at home, she does have very slight nystagmus on lateral gaze. I will check blood levels of the carbamazepine and the Primidone.   I am probably going to decrease the Primidone as it is the more likely of the 2 to cause intoxication however it is possible that Primidone interferes with metabolism of the carbamazepine and that may be high either or as well.     CONGENITAL STATIC ENCEPHALOPATHY  Stable, no investigation required.

## 2019-05-06 LAB
CARBAMAZEPINE SERPL-MCNC: 13 UG/ML (ref 4–12)
PHENOBARB SERPL-MCNC: 31 UG/ML (ref 15–40)
PRIMIDONE SERPL-MCNC: 19.2 UG/ML (ref 5–12)

## 2019-05-06 NOTE — PROGRESS NOTES
Her Primidone levels are high however her phenobarbital levels which are the active ingredient are within normal limits, I see no reason to alter the dosing at this time.

## 2019-05-07 ENCOUNTER — TELEPHONE (OUTPATIENT)
Dept: NEUROLOGY | Age: 59
End: 2019-05-07

## 2019-11-13 ENCOUNTER — OFFICE VISIT (OUTPATIENT)
Dept: NEUROLOGY | Age: 59
End: 2019-11-13

## 2019-11-13 VITALS
OXYGEN SATURATION: 98 % | DIASTOLIC BLOOD PRESSURE: 80 MMHG | HEIGHT: 61 IN | SYSTOLIC BLOOD PRESSURE: 118 MMHG | HEART RATE: 80 BPM | WEIGHT: 166 LBS | BODY MASS INDEX: 31.34 KG/M2

## 2019-11-13 DIAGNOSIS — G40.309 NONINTRACTABLE GENERALIZED IDIOPATHIC EPILEPSY WITHOUT STATUS EPILEPTICUS (HCC): Primary | ICD-10-CM

## 2019-11-13 DIAGNOSIS — E55.9 VITAMIN D DEFICIENCY: ICD-10-CM

## 2019-11-13 DIAGNOSIS — Q07.9 CONGENITAL ENCEPHALOPATHY (HCC): ICD-10-CM

## 2019-11-13 DIAGNOSIS — Z51.81 THERAPEUTIC DRUG MONITORING: ICD-10-CM

## 2019-11-13 RX ORDER — ERGOCALCIFEROL 1.25 MG/1
CAPSULE ORAL
Refills: 3 | COMMUNITY
Start: 2019-08-21 | End: 2020-03-08

## 2019-11-13 RX ORDER — ACETAMINOPHEN 500 MG
TABLET ORAL 2 TIMES DAILY
COMMUNITY
End: 2020-11-05

## 2019-11-13 NOTE — PATIENT INSTRUCTIONS
Epilepsy: Care Instructions  Your Care Instructions    Epilepsy is a common condition that causes repeated seizures. The seizures are caused by bursts of electrical activity in the brain that aren't normal. Seizures may cause problems with muscle control, movement, speech, vision, or awareness. They can be scary. Epilepsy affects each person differently. Some people have only a few seizures. Others get them more often. If you know what triggers a seizure, you may be able to avoid having one. You can take medicines to control and reduce seizures. You and your doctor will need to find the right combination, schedule, and dose of medicine. This may take time and careful changes. Seizures may get worse and happen more often over time. Follow-up care is a key part of your treatment and safety. Be sure to make and go to all appointments, and call your doctor if you are having problems. It's also a good idea to know your test results and keep a list of the medicines you take. How can you care for yourself at home? · Be safe with medicines. Take your medicines exactly as prescribed. Call your doctor if you think you are having a problem with your medicine. · Make a treatment plan with your doctor. Be sure to follow your plan. · Try to identify and avoid things that may make you more likely to have a seizure. These may include:  ? Not getting enough sleep. ? Using drugs or alcohol. ? Being emotionally stressed. ? Skipping meals. · Keep a record of any seizures you have. Note the date, time of day, and any details about the seizure that you can remember. Your doctor can use this information to plan or adjust your medicine or other treatment. · Be sure that any doctor treating you for another condition knows that you have epilepsy. Each doctor should know what medicines you are taking, if any. · Wear a medical ID bracelet. You can buy this at most [facility].  If you have a seizure that leaves you unconscious or unable to speak for yourself, this bracelet will let those who are treating you know that you have epilepsy. · Talk to your doctor about whether it is safe for you to do certain activities, such as drive or swim. When should you call for help? Call 911 anytime you think you may need emergency care. For example, call if:    · A seizure does not stop as it normally does.     · You have new symptoms such as:  ? Numbness, tingling, or weakness on one side of your body or face. ? Vision changes. ? Trouble speaking or thinking clearly.    Call your doctor now or seek immediate medical care if:    · You have a fever.     · You have a severe headache.    Watch closely for changes in your health, and be sure to contact your doctor if:    · The normal pattern or features of your seizures change. Where can you learn more? Go to http://augustinaDuolingo.info/. Sugey Bianchi in the search box to learn more about \"Epilepsy: Care Instructions. \"  Current as of: March 28, 2019  Content Version: 12.2  © 8982-3973 [employer]. Care instructions adapted under license by [employer] (which disclaims liability or warranty for this information). If you have questions about a medical condition or this instruction, always ask your healthcare professional. [address] any warranty or liability for your use of this information. Learning About Vitamin D  Why is it important to get enough vitamin D? Your body needs vitamin D to absorb calcium. Calcium keeps your bones and muscles, including your heart, healthy and strong. If your muscles don't get enough calcium, they can cramp, hurt, or feel weak. You may have long-term (chronic) muscle aches and pains. If you don't get enough vitamin D throughout life, you have an increased chance of having thin and brittle bones (osteoporosis) in your later years.  Children who don't get enough vitamin D may not grow as much as others their age. They also have a chance of getting a rare disease called rickets. It causes weak bones. Vitamin D and calcium are added to many foods. And your body uses sunshine to make its own vitamin D. How much vitamin D do you need? The [place] [employer] recommends that people ages 3 through 79 get 600 IU (international units) every day. Adults 71 and older need 800 IU every day. Blood tests for vitamin D can check your vitamin D level. But there is no standard normal range used by all laboratories. You're likely getting enough vitamin D if your levels are in the range of 20 to 50 ng/mL. How can you get more vitamin D? Foods that contain vitamin D include:  · [place], tuna, and mackerel. These are some of the best foods to eat when you need to get more vitamin D.  · Cheese, egg yolks, and beef liver. These foods have vitamin D in small amounts. · Milk, soy drinks, orange juice, yogurt, margarine, and some kinds of cereal have vitamin D added to them. Some people don't make vitamin D as well as others. They may have to take extra care in getting enough vitamin D. Things that reduce how much vitamin D your body makes include:  · Dark skin, such as many  Americans have. · Age, especially if you are older than 72. · Digestive problems, such as Crohn's or celiac disease. · Liver and kidney disease. Some people who do not get enough vitamin D may need supplements. Are there any risks from taking vitamin D?  · Too much vitamin D:  ? Can damage your kidneys. ? Can cause nausea and vomiting, constipation, and weakness. ? Raises the amount of calcium in your blood. If this happens, you can get confused or have an irregular heart rhythm. · Vitamin D may interact with other medicines. Tell your doctor about all of the medicines you take, including over-the-counter drugs, herbs, and pills. Tell your doctor about all of your current medical problems. Where can you learn more?   Go to http://richard.info/. Enter [id number] in the search box to learn more about \"Learning About Vitamin D.\"  Current as of: November 7, 2018  Content Version: 12.2  © 3553-4065 [employer]. Care instructions adapted under license by [employer] (which disclaims liability or warranty for this information). If you have questions about a medical condition or this instruction, always ask your healthcare professional. [address] any warranty or liability for your use of this information.

## 2019-11-13 NOTE — LETTER
11/21/19 Patient: Gricel Abarca YOB: 1960 Date [facility] Visit: [facility]/2019 [facility] [id number] VIA Facsimile: [phone] Dear Jeremi Corrales MD, Thank you for referring Ms. Gricel Abarca to [address] [place] [address] for evaluation. My notes for this consultation are attached. If you have questions, please do not hesitate to call me. I look forward to following your patient along with you. Sincerely, Gretel Mena MD

## 2019-11-13 NOTE — PROGRESS NOTES
NEUROLOGY CLINIC NOTE    Patient ID:  Joana Nichole  [id number]  63 y.o.  1960    Date of Consultation:  November 13, 2019    Reason for Consultation:  Seizures    Chief Complaint   Patient presents with    Follow-up     last seizure October 2019       History of Present Illness:     Patient Active Problem List    Diagnosis Date Noted    Congenital encephalopathy ([facility] 09/07/2018    Partial idiopathic epilepsy with seizures of localized onset, not intractable, without status epilepticus ([facility] 09/07/2018     Past Medical History:   Diagnosis Date    Seizures ([facility]       Past Surgical History:   Procedure Laterality Date    HX TUBAL LIGATION        Prior to Admission medications    Medication Sig Start Date End Date Taking? Authorizing Provider   cholecalciferol (VITAMIN D3) 2,000 unit cap capsule Take  by mouth two (2) times a day. Yes Provider, Historical   carBAMazepine (TEGRETOL) 200 mg tablet 2 po  Three times a day  Indications: Convulsive Seizures 5/3/19  Yes Virginie Lucio MD   primidone (MYSOLINE) 250 mg tablet TAKE 1 TABLET BY MOUTH 3 TIMES A DAY  Indications: epilepsy 5/3/19  Yes Virginie Lucio MD   primidone (MYSOLINE) 50 mg tablet 1 twice daily  Indications: epilepsy 5/3/19  Yes Virginie Lucio MD   ibuprofen (MOTRIN) 600 mg tablet Take 1 Tab by mouth every six (6) hours as needed for Pain. 12/26/15   Tigre Segovia MD   HYDROcodone-acetaminophen (NORCO) 5-325 mg per tablet Take 1 Tab by mouth every four (4) hours as needed for Pain. Max Daily Amount: 6 Tabs.  12/26/15   Tigre Segovia MD     No Known Allergies   Social History     Tobacco Use    Smoking status: Never Smoker    Smokeless tobacco: Never Used   Substance Use Topics    Alcohol use: No      Family History   Problem Relation Age of Onset    No Known Problems Mother     No Known Problems Father         Subjective:      Joana Nichole is a 63 y.o. female with history of epilepsy and congenital encephalopathy who is here for follow-up. Patient was initially seen by Dr. Aneta Dailey (neurology) 3/2/2018 for epilepsy. Note mentions patient having 5-6 seizures per month. History of congenital static encephalopathy. Lives with her mother. Previously being followed by a physician in [place]. Having seizures since age 7 months. Patient is on primidone 250 mg TID and Tegretol 400 mg twice daily. Primidone level was 5.4 and phenobarbital derivative was 9. Carbamazepine level was 8.6. Primidone 50 mg twice daily was added to her regimen. Patient was seen on follow-up 9/7/2018 and note mentions patient doing well since primidone was increased. Patient still having breakthrough seizures several times a month which composed of contortion of the upper body with arms rising and head turning and making noises. No loss of consciousness and it does not generalized. Plan was to continue her current regiment of 300 mg of primidone 3 times a day and carbamazepine 400 mg twice daily. Primidone level was slightly elevated at 12.2 and phenobarbital level was 30. Patient was last seen by Dr. Aneta Dailey 5/3/2019 and note mentions patient having no breakthrough seizures since the last visit. Exam noted some unsteadiness to her gait and nystagmus on lateral gaze. Labs done revealed elevated carbamazepine level at 13, elevated primidone level at 19.2 and phenobarbital level of 31. Patient was told to decrease her 50 mg primidone to twice daily instead of 3 times daily. Since the last visit, mother and patient reports that the last seizure was about 1 month prior to consult. She can still have spells at most 5-6 and a month. She continues to take carbamazepine 200 mg, 2-3 times a day and primidone 250 mg 3 times daily plus 50 mg twice daily. Denies any side effects. Outside reports reviewed: office notes, lab reports.     Review of Systems:    A comprehensive review of systems was negative except for: Fatigue, anxiety, seizures    Objective:     Visit Vitals  /80   Pulse 80   Ht 5' 1\" (1.549 m)   Wt 166 lb (75.3 kg)   SpO2 98%   BMI 31.37 kg/m²       PHYSICAL EXAM:    NEUROLOGICAL EXAM:    Appearance: The patient is well developed, well nourished, provides a coherent history and is in no acute distress. Mental Status: Oriented to time, place and person. Fluent, no aphasia or dysarthria. Slow to respond at times. Mood and affect appropriate. Cranial Nerves:   Intact visual fields. NIKHIL, EOM's full, no nystagmus, no ptosis. Facial sensation is normal. Corneal reflexes are intact. Facial movement is symmetric. Hearing is normal bilaterally. Palate is midline with normal elevation. Sternocleidomastoid and trapezius muscles are normal. Tongue is midline. Motor:  5/5 strength. Normal bulk and tone. No pronator drift. Reflexes:   Deep tendon reflexes were symmetrical.    Sensory:   Normal to cold, pinprick and vibration. Gait:  Steady. No Romberg. Tremor:   No tremor noted. Cerebellar:  Intact FTN/MIRYAM/HTS. Labs Reviewed      Assessment:   Epilepsy  Congenital encephalopathy  Vitamin D deficiency  Therapeutic drug monitoring    Plan:   Neurological examination reveals some mild cognitive impairment but no focal findings. Patient with known history of epilepsy. Patient still having some episodes of posturing but no loss of consciousness. Unclear whether this or focal breakthrough seizures or non-epileptogenic events. Discussed need to do a 24-hour EEG to see if we can capture these events and also see if patient is still having some subclinical seizure activity. Patient and mother agreed. 24-hour EEG was ordered. For now continue carbamazepine 200 mg, 2-3 times a day and primidone 250 mg 3 times daily plus 50 mg twice daily. Changing and dosing will depend on results of laboratory work-up and breakthrough seizures.  Patient was advised regarding seizure precautions and lowering seizure threshold. Advised patient to sleep on time and 7-8 hours a night. Do not sleep deprived. Do not skip meals. Do not be dehydrated. Patient was also advised regarding safety. Do not do tub baths only showers. Do not work in heights unless harnessed or with supervision. Patient understood. Patient with baseline cognitive slowing which is unchanged. Encouraged to continue doing mental and routine structured physical exercises. Long-term use of seizure medications can predispose patient to develop vitamin D deficiency. Suspect that this is ongoing. Patient is currently on supplementation. Check if adequate. Check vitamin D levels today. Check CBC, CMP, primidone levels and carbamazepine levels. Further intervention be done pending results. All questions and concerns were answered. Visit lasted 45 minutes. Greater than 50% was spent reviewing her medical records including laboratory work-up as summarized above, discussion about her condition, etiology, prognosis, need to further assess what this breakthrough spells are, order 24-hour ambulatory EEG, need to assess for potential adverse effect of her current medication, laboratory work-up ordered, seizure precaution and safety, treatment options, medication    This note was created using voice recognition software. Despite editing, there may be syntax errors.

## 2019-11-14 LAB
25(OH)D3+25(OH)D2 SERPL-MCNC: 21.9 NG/ML (ref 30–100)
ALBUMIN SERPL-MCNC: 4.1 G/DL (ref 3.5–5.5)
ALBUMIN/GLOB SERPL: 1.2 {RATIO} (ref 1.2–2.2)
ALP SERPL-CCNC: 145 IU/L (ref 39–117)
ALT SERPL-CCNC: 14 IU/L (ref 0–32)
AST SERPL-CCNC: 16 IU/L (ref 0–40)
BASOPHILS # BLD AUTO: 0 X10E3/UL (ref 0–0.2)
BASOPHILS NFR BLD AUTO: 1 %
BILIRUB SERPL-MCNC: <0.2 MG/DL (ref 0–1.2)
BUN SERPL-MCNC: 12 MG/DL (ref 6–24)
BUN/CREAT SERPL: 14 (ref 9–23)
CALCIUM SERPL-MCNC: 9 MG/DL (ref 8.7–10.2)
CARBAMAZEPINE SERPL-MCNC: 9.7 UG/ML (ref 4–12)
CHLORIDE SERPL-SCNC: 105 MMOL/L (ref 96–106)
CO2 SERPL-SCNC: 22 MMOL/L (ref 20–29)
CREAT SERPL-MCNC: 0.88 MG/DL (ref 0.57–1)
EOSINOPHIL # BLD AUTO: 0.3 X10E3/UL (ref 0–0.4)
EOSINOPHIL NFR BLD AUTO: 6 %
ERYTHROCYTE [DISTWIDTH] IN BLOOD BY AUTOMATED COUNT: 13.1 % (ref 12.3–15.4)
GLOBULIN SER CALC-MCNC: 3.4 G/DL (ref 1.5–4.5)
GLUCOSE SERPL-MCNC: 76 MG/DL (ref 65–99)
HCT VFR BLD AUTO: 39.9 % (ref 34–46.6)
HGB BLD-MCNC: 13.9 G/DL (ref 11.1–15.9)
IMM GRANULOCYTES # BLD AUTO: 0 X10E3/UL (ref 0–0.1)
IMM GRANULOCYTES NFR BLD AUTO: 0 %
LYMPHOCYTES # BLD AUTO: 2 X10E3/UL (ref 0.7–3.1)
LYMPHOCYTES NFR BLD AUTO: 35 %
MCH RBC QN AUTO: 32.1 PG (ref 26.6–33)
MCHC RBC AUTO-ENTMCNC: 34.8 G/DL (ref 31.5–35.7)
MCV RBC AUTO: 92 FL (ref 79–97)
MONOCYTES # BLD AUTO: 0.5 X10E3/UL (ref 0.1–0.9)
MONOCYTES NFR BLD AUTO: 8 %
NEUTROPHILS # BLD AUTO: 2.9 X10E3/UL (ref 1.4–7)
NEUTROPHILS NFR BLD AUTO: 50 %
PHENOBARB SERPL-MCNC: 34 UG/ML (ref 15–40)
PLATELET # BLD AUTO: 183 X10E3/UL (ref 150–450)
POTASSIUM SERPL-SCNC: 4.5 MMOL/L (ref 3.5–5.2)
PRIMIDONE SERPL-MCNC: 15.8 UG/ML (ref 5–12)
PROT SERPL-MCNC: 7.5 G/DL (ref 6–8.5)
RBC # BLD AUTO: 4.33 X10E6/UL (ref 3.77–5.28)
SODIUM SERPL-SCNC: 142 MMOL/L (ref 134–144)
WBC # BLD AUTO: 5.6 X10E3/UL (ref 3.4–10.8)

## 2020-03-08 ENCOUNTER — OFFICE VISIT (OUTPATIENT)
Dept: URGENT CARE | Age: 60
End: 2020-03-08

## 2020-03-08 VITALS
HEART RATE: 93 BPM | OXYGEN SATURATION: 99 % | DIASTOLIC BLOOD PRESSURE: 76 MMHG | TEMPERATURE: 98.4 F | WEIGHT: 160 LBS | BODY MASS INDEX: 30.21 KG/M2 | SYSTOLIC BLOOD PRESSURE: 116 MMHG | HEIGHT: 61 IN | RESPIRATION RATE: 18 BRPM

## 2020-03-08 DIAGNOSIS — R11.2 NON-INTRACTABLE VOMITING WITH NAUSEA, UNSPECIFIED VOMITING TYPE: Primary | ICD-10-CM

## 2020-03-08 RX ORDER — BENZONATATE 200 MG/1
200 CAPSULE ORAL
Qty: 30 CAP | Refills: 0 | Status: SHIPPED | OUTPATIENT
Start: 2020-03-08 | End: 2020-11-05

## 2020-03-08 RX ORDER — ONDANSETRON 4 MG/1
4 TABLET, ORALLY DISINTEGRATING ORAL
Qty: 1 TAB | Refills: 0 | Status: SHIPPED | COMMUNITY
Start: 2020-03-08 | End: 2020-03-08

## 2020-03-08 RX ORDER — ONDANSETRON 4 MG/1
4 TABLET, ORALLY DISINTEGRATING ORAL
Qty: 10 TAB | Refills: 0 | Status: SHIPPED | OUTPATIENT
Start: 2020-03-08 | End: 2020-11-05

## 2020-03-08 NOTE — PATIENT INSTRUCTIONS
Fluids Stop Doxycycline Follow up with PCP 
ER if worse Nausea and Vomiting: Care Instructions Your Care Instructions When you are nauseated, you may feel weak and sweaty and notice a lot of saliva in your mouth. Nausea often leads to vomiting. Most of the time you do not need to worry about nausea and vomiting, but they can be signs of other illnesses. Two common causes of nausea and vomiting are stomach flu and food poisoning. Nausea and vomiting from viral stomach flu will usually start to improve within 24 hours. Nausea and vomiting from food poisoning may last from 12 to 48 hours. The doctor has checked you carefully, but problems can develop later. If you notice any problems or new symptoms, get medical treatment right away. Follow-up care is a key part of your treatment and safety. Be sure to make and go to all appointments, and call your doctor if you are having problems. It's also a good idea to know your test results and keep a list of the medicines you take. How can you care for yourself at home? · To prevent dehydration, drink plenty of fluids, enough so that your urine is light yellow or clear like water. Choose water and other caffeine-free clear liquids until you feel better. If you have kidney, heart, or liver disease and have to limit fluids, talk with your doctor before you increase the amount of fluids you drink. · Rest in bed until you feel better. · When you are able to eat, try clear soups, mild foods, and liquids until all symptoms are gone for 12 to 48 hours. Other good choices include dry toast, crackers, cooked cereal, and gelatin dessert, such as Jell-O. When should you call for help? Call 911 anytime you think you may need emergency care. For example, call if: 
  · You passed out (lost consciousness).  
 Call your doctor now or seek immediate medical care if: 
  · You have symptoms of dehydration, such as: 
? Dry eyes and a dry mouth. ? Passing only a little dark urine. ? Feeling thirstier than usual.  
  · You have new or worsening belly pain.  
  · You have a new or higher fever.  
  · You vomit blood or what looks like coffee grounds.  
 Watch closely for changes in your health, and be sure to contact your doctor if: 
  · You have ongoing nausea and vomiting.  
  · Your vomiting is getting worse.  
  · Your vomiting lasts longer than 2 days.  
  · You are not getting better as expected. Where can you learn more? Go to http://augustina-elise.info/. Enter 25 [id number] in the search box to learn more about \"Nausea and Vomiting: Care Instructions. \" Current as of: June 26, 2019 Content Version: 12.2 © 1754-1744 [employer]. Care instructions adapted under license by [employer] (which disclaims liability or warranty for this information). If you have questions about a medical condition or this instruction, always ask your healthcare professional. [address] any warranty or liability for your use of this information.

## 2020-03-08 NOTE — PROGRESS NOTES
Alber Pina who is accompanied by caregiver presents with nausea and vomiting since starting doxycycline for URI last week. Admits to slight cough. Denies abdominal pain, ear pain, ST, fever, diarrhea. The history is provided by a caregiver. Past Medical History:   Diagnosis Date    Seizures ([facility]         Past Surgical History:   Procedure Laterality Date    HX TUBAL LIGATION           Family History   Problem Relation Age of Onset    No Known Problems Mother     No Known Problems Father         Social History     Socioeconomic History    Marital status: UNKNOWN     Spouse name: Not on file    Number of children: Not on file    Years of education: Not on file    Highest education level: Not on file   Occupational History    Not on file   Social Needs    Financial resource strain: Not on file    Food insecurity:     Worry: Not on file     Inability: Not on file    Transportation needs:     Medical: Not on file     Non-medical: Not on file   Tobacco Use    Smoking status: Never Smoker    Smokeless tobacco: Never Used   Substance and Sexual Activity    Alcohol use: No    Drug use: Not on file    Sexual activity: Not on file   Lifestyle    Physical activity:     Days per week: Not on file     Minutes per session: Not on file    Stress: Not on file   Relationships    Social connections:     Talks on phone: Not on file     Gets together: Not on file     Attends [employer] service: Not on file     Active member of club or organization: Not on file     Attends meetings of clubs or organizations: Not on file     Relationship status: Not on file    Intimate partner violence:     Fear of current or ex partner: Not on file     Emotionally abused: Not on file     Physically abused: Not on file     Forced sexual activity: Not on file   Other Topics Concern    Not on file   Social History Narrative    Not on file                ALLERGIES: Patient has no known allergies.     Review of Systems Constitutional: Negative for activity change, appetite change, chills and fever. HENT: Negative for congestion, ear pain, rhinorrhea, sinus pressure, sinus pain, sore throat and trouble swallowing. Respiratory: Positive for cough. Negative for shortness of breath and wheezing. Cardiovascular: Negative for chest pain and palpitations. Gastrointestinal: Positive for nausea and vomiting. Negative for abdominal pain and diarrhea. Musculoskeletal: Negative for myalgias. Skin: Negative for rash. Neurological: Negative for dizziness and headaches. Hematological: Negative for adenopathy. Vitals:    03/08/20 1647   BP: 116/76   Pulse: 93   Resp: 18   Temp: 98.4 °F (36.9 °C)   SpO2: 99%   Weight: 160 lb (72.6 kg)   Height: 5' 1\" (1.549 m)       Physical Exam  Vitals signs and nursing note reviewed. Constitutional:       General: She is not in acute distress. Appearance: She is well-developed. She is not diaphoretic. HENT:      Right Ear: Tympanic membrane, ear canal and external ear normal.      Left Ear: Tympanic membrane, ear canal and external ear normal.      Nose: Nose normal.      Right Sinus: No maxillary sinus tenderness or frontal sinus tenderness. Left Sinus: No maxillary sinus tenderness or frontal sinus tenderness. Mouth/Throat:      Pharynx: No oropharyngeal exudate or posterior oropharyngeal erythema. Tonsils: No tonsillar abscesses. Cardiovascular:      Rate and Rhythm: Normal rate and regular rhythm. Heart sounds: Normal heart sounds. Pulmonary:      Effort: Pulmonary effort is normal. No respiratory distress. Breath sounds: Normal breath sounds. No wheezing or rales. Abdominal:      General: Bowel sounds are normal. There is no distension. Palpations: Abdomen is soft. Abdomen is not rigid. Tenderness: There is no abdominal tenderness. There is no guarding or rebound. Lymphadenopathy:      Cervical: No cervical adenopathy. Neurological:      Mental Status: She is alert. [facility]    ICD-10-CM ICD-9-CM   1. Non-intractable vomiting with nausea, unspecified vomiting type R11.2 787.01       Orders Placed This Encounter    ondansetron (ZOFRAN ODT) 4 mg disintegrating tablet     Sig: Take 1 Tab by mouth now for 1 dose. Dispense:  1 Tab     Refill:  0     Order Specific Question:   Expiration Date     Answer:   11/8/2021     Order Specific Question:   Lot#     Answer:   [id number]     Order Specific Question:        Answer: Maribel     Order Specific Question:   NDC#     Answer:   [id number] [id number]    ondansetron (ZOFRAN ODT) 4 mg disintegrating tablet     Sig: Take 1 Tab by mouth every eight (8) hours as needed for Nausea. Dispense:  10 Tab     Refill:  0    benzonatate (TESSALON) 200 mg capsule     Sig: Take 1 Cap by mouth three (3) times daily as needed for Cough. Dispense:  30 Cap     Refill:  0      Stop Doxycycline  Fluids with electrolytes  Zofran prn  The patient is to follow up with PCP. If signs and symptoms become worse the pt is to go to the ER.          Procedures

## 2020-04-23 RX ORDER — CARBAMAZEPINE 200 MG/1
TABLET ORAL
Qty: 180 TAB | Refills: 0 | Status: SHIPPED | OUTPATIENT
Start: 2020-04-23 | End: 2020-05-04 | Stop reason: SDUPTHER

## 2020-04-29 ENCOUNTER — TELEPHONE (OUTPATIENT)
Dept: NEUROLOGY | Age: 60
End: 2020-04-29

## 2020-04-30 DIAGNOSIS — G40.309 NONINTRACTABLE GENERALIZED IDIOPATHIC EPILEPSY WITHOUT STATUS EPILEPTICUS (HCC): ICD-10-CM

## 2020-04-30 NOTE — TELEPHONE ENCOUNTER
Received refill request for primidone 50 mg from [facility] pharmacy for 90 days  Last filled 2/3/20   Last seen in office on 11/13/19 by Glenys Batista   Virtual visit scheduled with Ira Cronin NP for 5/4/20

## 2020-05-04 ENCOUNTER — OFFICE VISIT (OUTPATIENT)
Dept: NEUROLOGY | Age: 60
End: 2020-05-04

## 2020-05-04 DIAGNOSIS — R56.9 SEIZURES (HCC): Primary | ICD-10-CM

## 2020-05-04 DIAGNOSIS — F41.9 ANXIETY: ICD-10-CM

## 2020-05-04 DIAGNOSIS — Z51.81 THERAPEUTIC DRUG MONITORING: ICD-10-CM

## 2020-05-04 DIAGNOSIS — G40.019 LOCALIZATION-RELATED (FOCAL) (PARTIAL) IDIOPATHIC EPILEPSY AND EPILEPTIC SYNDROMES WITH SEIZURES OF LOCALIZED ONSET, INTRACTABLE, WITHOUT STATUS EPILEPTICUS (HCC): ICD-10-CM

## 2020-05-04 DIAGNOSIS — Q07.9 CONGENITAL ENCEPHALOPATHY (HCC): ICD-10-CM

## 2020-05-04 RX ORDER — ESTRADIOL 0.1 MG/G
CREAM VAGINAL
COMMUNITY
Start: 2020-04-27

## 2020-05-04 RX ORDER — HYDROXYZINE PAMOATE 25 MG/1
25 CAPSULE ORAL
Qty: 90 CAP | Refills: 2 | Status: SHIPPED | OUTPATIENT
Start: 2020-05-04 | End: 2020-10-20 | Stop reason: SINTOL

## 2020-05-04 RX ORDER — PRIMIDONE 50 MG/1
TABLET ORAL
Qty: 180 TAB | Refills: 3 | Status: SHIPPED | OUTPATIENT
Start: 2020-05-04 | End: 2021-03-24

## 2020-05-04 RX ORDER — PRIMIDONE 250 MG/1
TABLET ORAL
Qty: 90 TAB | Refills: 2 | Status: SHIPPED | OUTPATIENT
Start: 2020-05-04 | End: 2020-06-17

## 2020-05-04 RX ORDER — CARBAMAZEPINE 200 MG/1
TABLET ORAL
Qty: 180 TAB | Refills: 0 | Status: SHIPPED | OUTPATIENT
Start: 2020-05-04 | End: 2020-05-19 | Stop reason: SDUPTHER

## 2020-05-04 NOTE — PATIENT INSTRUCTIONS
As per our discussion we will continue on brand-name Tegretol 200 mg taking 2 tablets 3 times a day along with primidone also known as Mysoline 250 mg 1 tablet 3 times a day as well as the Mysoline 50 mg 1 tablet twice a day. I have ordered Atarax also known as Vistaril 25 mg 1 tablet 3 times a day as needed for agitation and anxiety. If this is not sufficient please follow-up with primary care for additional intervention. Prescriptions have been sent to the pharmacy. However going forward if she needs further prescriptions or renewals please asked the pharmacy to contact our office directly as this is the most efficient method of getting this completed. Blood work slip is included please go ahead and get blood work drawn within the next 4 to 6 weeks. We will see you back in the office in 6 months hopefully in person sooner if there are problems issues or concerns.  
 
In the meantime continue to follow CDC guidelines related to COVID 19

## 2020-05-04 NOTE — PROGRESS NOTES
Abby Kingsley is a 62 y.o. female who presents today for the following:  Chief Complaint   Patient presents with    Follow-up     2 siezures last week    Seizure         HPI  Historical Data  Patient is known to the practice  Abby Kingsley is a female with history of epilepsy and congenital encephalopathy     Patient was initially seen by Dr. Cori Swan (neurology) 3/2/2018 for epilepsy. Note mentions patient having 5-6 seizures per month. History of congenital static encephalopathy. Lives with her mother. Previously being followed by a physician in [place]. Having seizures since age 7 months. Patient is on primidone 250 mg TID and Tegretol 400 mg twice daily. Tegretol is brand-name only as she has significant increase in seizures on generic to almost daily; prescription states TID   Primidone level was 5.4 and phenobarbital derivative was 9. Carbamazepine level was 8.6. Primidone 50 mg twice daily was added to her regimen. Patient was seen on follow-up 9/7/2018 and note mentions patient doing well since primidone was increased. Patient still having breakthrough seizures several times a month which composed of contortion of the upper body with arms rising and head turning and making noises. No loss of consciousness and it does not generalized. Rubs hands together or stand still w starring lasts several mins and is postictal for a bit \"makes her tired\" and she knows everything going on around her     Plan was to continue her current regiment of 300 mg of primidone 3 times a day and carbamazepine 400 mg twice daily. Primidone level was slightly elevated at 12.2 and phenobarbital level was 30. Patient was then seen by Dr. Cori Swan 5/3/2019 and note mentions patient having no breakthrough seizures since the last visit. Exam noted some unsteadiness to her gait and nystagmus on lateral gaze.   Labs done revealed elevated carbamazepine level at 13, elevated primidone level at 19.2 and phenobarbital level of 31. Patient was told to decrease her 50 mg primidone to twice daily instead of 3 times daily.     She can still have spells at most 5-6 and a month. She continues to take carbamazepine 200 mg, 2 tabs  3 times a day and primidone 250 mg 3 times daily plus 50 mg twice daily. Denies any side effects. Interim Data:   Patient is known to this practice. This is my first time seeing the patient. Chart and history reviewed in detail at today's office visit. This is a telephone visit and her sister Dariana Vickers is on the phone with the patient. Unchanged from above    Sister who is the primary caregiver states that there are times that she gets bit agitated and anxious and thinks she needs a little something to use as needed. She understands with the Mysoline has some phenobarb in it which helps to calm things down but it is not enough. She is asking for something mild to give on a as needed basis. Allergies   Allergen Reactions    Doxycycline Nausea and Vomiting       Current Outpatient Medications   Medication Sig    primidone (MYSOLINE) 50 mg tablet TAKE 1 TAB BY MOUTH TWICE DAILY INDICATIONS: EPILEPSY    carBAMazepine (TEGretol) 200 mg tablet 2 po  Three times a day  Indications: convulsive seizures    primidone (MYSOLINE) 250 mg tablet TAKE 1 TABLET BY MOUTH 3 TIMES A DAY  Indications: epilepsy    hydrOXYzine pamoate (VISTARIL) 25 mg capsule Take 1 Cap by mouth three (3) times daily as needed for Anxiety or Agitation.  ondansetron (ZOFRAN ODT) 4 mg disintegrating tablet Take 1 Tab by mouth every eight (8) hours as needed for Nausea.  cholecalciferol (VITAMIN D3) 2,000 unit cap capsule Take  by mouth two (2) times a day.  ibuprofen (MOTRIN) 600 mg tablet Take 1 Tab by mouth every six (6) hours as needed for Pain.     Estrace 0.01 % (0.1 mg/gram) vaginal cream INSERT 1/2 GM VAGINALLY TWICE WEEKLY    benzonatate (TESSALON) 200 mg capsule Take 1 Cap by mouth three (3) times daily as needed for Cough. No current facility-administered medications for this visit. Past Surgical History:   Procedure Laterality Date    HX TUBAL LIGATION         Family History   Problem Relation Age of Onset    No Known Problems Mother     No Known Problems Father        Social History     Socioeconomic History    Marital status: UNKNOWN     Spouse name: Not on file    Number of children: Not on file    Years of education: Not on file    Highest education level: Not on file   Tobacco Use    Smoking status: Never Smoker    Smokeless tobacco: Never Used   Substance and Sexual Activity    Alcohol use: No         ROS  Other than what is stated above under HPI there is no new or changing issues related to the following:  Constitutional: No recent weight change, fever,fatigue, sleep difficulties, or loss of appetite. ENT/Mouth:  No hearing loss, ringing in the ears, chronic sinus problem, nose bleeds sore throat, voice change, hoarseness, swollen glands in neck, or difficulties with chewing and swallowing. Cardiovascular:  No chest pain/angina pectoris, palpitations,swelling of feet/ankles/hands, or calf pain while walking. Respiratory: No chronic or frequent coughs, spitting up blood, shortness of breath, asthma, or wheezing. Gastrointestinal: No abdominal pain, heartburn, nausea, vomiting, constipation, frequent diarrhea, rectal bleeding, or blood in stool. Genitourinary: No frequent urination, burning or painful urination, blood in urine, incontinence or dribbling. Musculoskeletal:   No joint pain, stiffness/swelling, weakness of muscles, muscle pain/cramp, or back pain. Integument:   No rash/itching, change in skin color, change in hair/nails, or change in color/size of moles.    Neurological:  No dizziness/vertigo, loss of consciousness, numbness/tingling sensation, tremors, weakness in limbs, difficulty with balance, frequent or recurring headaches, memory loss or confusion. Psychiatric:   No nervousness, depression, hallucinations, paranoia or suspiciousness. Endocrine: No excessive thirst or urination, heat or cold intolerance. Hematologic/Lymphatic: No bleeding/bruising tendency, phlebitis, or past transfusion. EXAMINATION: Within the context and limitations of a telephone encounter      General appearance: Not testable    Psych/mental health:  Affect: Appropriate    PHQ  3 most recent PHQ Screens 5/4/2020   Little interest or pleasure in doing things Several days   Feeling down, depressed, irritable, or hopeless Several days   Total Score PHQ 2 2       HEENT: Not testable    Cardiovascular: Not testable    Respiratory: No dyspnea, wheezes or stridors audible through conversation    Musculoskeletal: Not testable    Integumentary: Not testable      Neurological Examination:   Mental Status:        MMSE  No flowsheet data found. Formal testing was not completed    She is cognitively dulled. She can answer simple questions and interact in the conversation and basic ways. Her sister provides most of the pertinent data and details    there was nothing concerning on general observation and discussion. Cranial Nerves:    Hearing intact to conversation  Voice with normal projection  Otherwise not testable      Motor: Not testable    Sensation: Not testable    Coordination/Cerebellar:   Not testable    Gait: Not testable    Fall risk assessment  No flowsheet data found. Reflexes: Not testable    ASSESSMENT AND PLAN  Seizure disorder: Stable and unchanged over time. We need to get therapeutic levels and routine surveillance levels and will get those sent to the patient to have a lab work completed. Medications were renewed including brand-name Tegretol    For anxiety and agitation I am going to add Atarax 25 mg 1 3 times daily as needed.   If there needs to be further intervention I have asked him to follow-up with primary care ICD-10-CM ICD-9-CM    1. Seizures (HCC) R56.9 780.39 carBAMazepine (TEGretol) 200 mg tablet      primidone (MYSOLINE) 250 mg tablet      hydrOXYzine pamoate (VISTARIL) 25 mg capsule      CARBAMAZEPINE      PRIMIDONE (MYSOLINE)      CBC WITH AUTOMATED DIFF      METABOLIC PANEL, COMPREHENSIVE   2. Localization-related (focal) (partial) idiopathic epilepsy and epileptic syndromes with seizures of localized onset, intractable, without status epilepticus ([facility] [address] G40.019 345.51 carBAMazepine (TEGretol) 200 mg tablet      primidone (MYSOLINE) 250 mg tablet   3. Congenital encephalopathy (HCC) Q07.9 742.9    4. Anxiety F41.9 300.00 hydrOXYzine pamoate (VISTARIL) 25 mg capsule   5. Therapeutic drug monitoring Z51.81 V58.83 CARBAMAZEPINE      PRIMIDONE (MYSOLINE)      CBC WITH AUTOMATED DIFF      METABOLIC PANEL, COMPREHENSIVE           Additional pertinent medical data reviewed at today's office visit:       No visits with results within 3 Month(s) from this visit. Latest known visit with results is:   Office Visit on 11/13/2019   Component Date Value    WBC 11/13/2019 5.6     RBC 11/13/2019 4.33     HGB 11/13/2019 13.9     HCT 11/13/2019 39.9     MCV 11/13/2019 92     MCH 11/13/2019 32.1     MCHC 11/13/2019 34.8     RDW 11/13/2019 13.1     PLATELET 04/54/4316 586     NEUTROPHILS 11/13/2019 50     Lymphocytes 11/13/2019 35     MONOCYTES 11/13/2019 8     EOSINOPHILS 11/13/2019 6     BASOPHILS 11/13/2019 1     ABS. NEUTROPHILS 11/13/2019 2.9     Abs Lymphocytes 11/13/2019 2.0     ABS. MONOCYTES 11/13/2019 0.5     ABS. EOSINOPHILS 11/13/2019 0.3     ABS. BASOPHILS 11/13/2019 0.0     IMMATURE GRANULOCYTES 11/13/2019 0     ABS. IMM. GRANS.  11/13/2019 0.0     Glucose 11/13/2019 76     BUN 11/13/2019 12     Creatinine 11/13/2019 0.88     GFR est non-AA 11/13/2019 73     GFR est AA 11/13/2019 84     BUN/Creatinine ratio 11/13/2019 14     Sodium 11/13/2019 142     Potassium 11/13/2019 4.5     Chloride 11/13/2019 105     CO2 11/13/2019 22     Calcium 11/13/2019 9.0     Protein, total 11/13/2019 7.5     Albumin 11/13/2019 4.1     GLOBULIN, TOTAL 11/13/2019 3.4     A-G Ratio 11/13/2019 1.2     Bilirubin, total 11/13/2019 <0.2     Alk. phosphatase 11/13/2019 145*    AST (SGOT) 11/13/2019 16     ALT (SGPT) 11/13/2019 14     Carbamazepine 11/13/2019 9.7     Primidone, Serum 11/13/2019 15.8*    Phenobarbital 11/13/2019 34     VITAMIN D, 25-HYDROXY 11/13/2019 21.9*       XR Results (maximum last 3): Results from [place] encounter on 12/26/15   XR ELBOW LT MIN 3 V   XR SHOULDER LT AP/LAT MIN 2 V   Results from [place] encounter on 12/01/15   XR CHEST PA LAT       CT Results (maximum last 3): Results from [place] encounter on 05/17/18   CT ABD PELV W WO CONT    Impression  impression: Gallstones, obstructing left renal calculus. Uterine mass likely  small fibroid. MRI Results (maximum last 3): No results found for this or any previous visit.       Telephone visit length of time: 13 minutes and 30 seconds      Jaymie Deleon MS, ANP-BC, [facility]

## 2020-05-06 ENCOUNTER — TELEPHONE (OUTPATIENT)
Dept: NEUROLOGY | Age: 60
End: 2020-05-06

## 2020-05-06 NOTE — TELEPHONE ENCOUNTER
Called [facility] - no Rx's written by N.P. need any P.A.'s - patient picked up both dosages of Primidone yesterday.

## 2020-05-19 DIAGNOSIS — G40.019 LOCALIZATION-RELATED (FOCAL) (PARTIAL) IDIOPATHIC EPILEPSY AND EPILEPTIC SYNDROMES WITH SEIZURES OF LOCALIZED ONSET, INTRACTABLE, WITHOUT STATUS EPILEPTICUS (HCC): ICD-10-CM

## 2020-05-19 DIAGNOSIS — R56.9 SEIZURES (HCC): ICD-10-CM

## 2020-05-19 RX ORDER — CARBAMAZEPINE 200 MG/1
TABLET ORAL
Qty: 180 TAB | Refills: 0 | OUTPATIENT
Start: 2020-05-19

## 2020-05-19 RX ORDER — CARBAMAZEPINE 200 MG/1
TABLET ORAL
Qty: 180 TAB | Refills: 0 | Status: SHIPPED | OUTPATIENT
Start: 2020-05-19 | End: 2020-10-23 | Stop reason: SDUPTHER

## 2020-10-20 ENCOUNTER — TELEPHONE (OUTPATIENT)
Dept: NEUROLOGY | Age: 60
End: 2020-10-20

## 2020-10-20 DIAGNOSIS — R45.1 AGITATION: Primary | ICD-10-CM

## 2020-10-20 RX ORDER — RISPERIDONE 0.25 MG/1
TABLET, FILM COATED ORAL
Qty: 60 TAB | Refills: 2 | Status: SHIPPED | OUTPATIENT
Start: 2020-10-20 | End: 2020-11-05

## 2020-10-20 NOTE — TELEPHONE ENCOUNTER
I have discontinued the hydroxyzine sent a new prescription in the pharmacy for risperidone 0.25 mg 1 tablet twice a day as needed

## 2020-10-20 NOTE — TELEPHONE ENCOUNTER
Patients sister called stating when pt takes the hydroxyozine it makes her dizzy.   would like to know if there is something else she can take

## 2020-10-23 ENCOUNTER — TRANSCRIBE ORDER (OUTPATIENT)
Dept: INTERNAL MEDICINE CLINIC | Age: 60
End: 2020-10-23

## 2020-10-23 DIAGNOSIS — G40.019 LOCALIZATION-RELATED (FOCAL) (PARTIAL) IDIOPATHIC EPILEPSY AND EPILEPTIC SYNDROMES WITH SEIZURES OF LOCALIZED ONSET, INTRACTABLE, WITHOUT STATUS EPILEPTICUS (HCC): ICD-10-CM

## 2020-10-23 DIAGNOSIS — R56.9 SEIZURES (HCC): ICD-10-CM

## 2020-10-23 NOTE — TELEPHONE ENCOUNTER
----- Message from Eloy Tinoco Page sent at 10/23/2020 11:02 AM EDT -----  Regarding: NP / Refill  Medication Refill    Caller (if not patient): Delphine Blanchard       Relationship of caller (if not patient):  Sister Luan contact number(s):  [phone]      Name of medication and dosage if known: Tegretol       Is patient out of this medication (yes/no): Yes      Pharmacy name: [facility]    Pharmacy listed in chart? (yes/no): Yes  Pharmacy phone number:      Details to clarify the request:      Eloy Tinoco Page

## 2020-10-26 RX ORDER — CARBAMAZEPINE 200 MG/1
TABLET ORAL
Qty: 180 TAB | Refills: 3 | Status: SHIPPED | OUTPATIENT
Start: 2020-10-26 | End: 2020-10-27

## 2020-10-27 ENCOUNTER — TELEPHONE (OUTPATIENT)
Dept: NEUROLOGY | Age: 60
End: 2020-10-27

## 2020-10-27 DIAGNOSIS — R56.9 SEIZURES (HCC): Primary | ICD-10-CM

## 2020-10-27 RX ORDER — CARBAMAZEPINE 200 MG/1
400 TABLET ORAL 3 TIMES DAILY
Qty: 180 TAB | Refills: 12 | Status: SHIPPED | OUTPATIENT
Start: 2020-10-27 | End: 2021-05-24 | Stop reason: SDUPTHER

## 2020-10-27 NOTE — TELEPHONE ENCOUNTER
I am confused as to exactly what they wanted to so I sent it it is Tegretol brand-name only 200 mg 2 tablets 3 times a day with a 30-day supply and 12 refills

## 2020-10-27 NOTE — TELEPHONE ENCOUNTER
[facility]ed in a request for alternative for the Carbamazepine 200 mg take 2 tablets by mouth three times a day.  Please advise

## 2020-10-28 ENCOUNTER — TELEPHONE (OUTPATIENT)
Dept: NEUROLOGY | Age: 60
End: 2020-10-28

## 2020-10-28 RX ORDER — CARBAMAZEPINE 200 MG/1
200 TABLET ORAL 3 TIMES DAILY
OUTPATIENT
Start: 2020-10-28

## 2020-10-29 ENCOUNTER — TELEPHONE (OUTPATIENT)
Dept: NEUROLOGY | Age: 60
End: 2020-10-29

## 2020-10-29 NOTE — TELEPHONE ENCOUNTER
Tegretol - brand only   Resubmitted to med part D.     [facility] [place]  -  [id number] IND PDP C RTL ([address])  Covered: Retail, Mail Order Unknown: Specialty, Long-Term Care               Member ID: [id number] 1960 - F     Group ID: [id number]      Group Name: Kishore Adkins, [address]      Your information has been submitted to [place] Medicare Part D. [employer] Part D will review the request and will issue a decision, typically within 1-3 days from your submission. You can check the updated outcome later by reopening this request.    This should approve later today. I am faxing this michael to her [employer] store to keep rerunning it for approval later today.

## 2020-10-30 ENCOUNTER — TELEPHONE (OUTPATIENT)
Dept: NEUROLOGY | Age: 60
End: 2020-10-30

## 2020-10-30 NOTE — TELEPHONE ENCOUNTER
10/29/20 letter from [employer]ong Tripathi ([address] [place] [address]) approved date range 7/31/20 - 10/29/21.      Faxed to [facility].

## 2020-11-02 ENCOUNTER — TELEPHONE (OUTPATIENT)
Dept: NEUROLOGY | Age: 60
End: 2020-11-02

## 2020-11-02 NOTE — TELEPHONE ENCOUNTER
----- Message from [place] sent at 11/2/2020  1:06 PM EST -----  Regarding: NP [place]/Refill  Medication Refill    Caller (if not patient): Don Ford      Relationship of caller (if not patient): Sister Luan contact number(s):  [phone]      Name of medication and dosage if known: Tegretol, 200mg      Is patient out of this medication (yes/no): N      Pharmacy name: [facility]    Pharmacy listed in chart? (yes/no): Y    Pharmacy phone number: [phone]      Details to clarify the request: Patient will soon be out of this medication. She needs the brand name, Tegretol. She cannot take the generic brand or else she will have seizures.       Neto

## 2020-11-05 ENCOUNTER — HOSPITAL ENCOUNTER (OUTPATIENT)
Dept: LAB | Age: 60
Discharge: HOME | End: 2020-11-05
Payer: MEDICARE

## 2020-11-05 ENCOUNTER — OFFICE VISIT (OUTPATIENT)
Dept: NEUROLOGY | Age: 60
End: 2020-11-05
Payer: MEDICARE

## 2020-11-05 VITALS
BODY MASS INDEX: 33.23 KG/M2 | HEART RATE: 95 BPM | OXYGEN SATURATION: 98 % | DIASTOLIC BLOOD PRESSURE: 80 MMHG | SYSTOLIC BLOOD PRESSURE: 156 MMHG | TEMPERATURE: 97.9 F | HEIGHT: 61 IN | WEIGHT: 176 LBS | RESPIRATION RATE: 18 BRPM

## 2020-11-05 DIAGNOSIS — Q07.9 CONGENITAL ENCEPHALOPATHY (HCC): Primary | ICD-10-CM

## 2020-11-05 DIAGNOSIS — Z51.81 THERAPEUTIC DRUG MONITORING: ICD-10-CM

## 2020-11-05 DIAGNOSIS — G40.009 PARTIAL IDIOPATHIC EPILEPSY WITH SEIZURES OF LOCALIZED ONSET, NOT INTRACTABLE, WITHOUT STATUS EPILEPTICUS (HCC): ICD-10-CM

## 2020-11-05 PROCEDURE — 80188 ASSAY OF PRIMIDONE: CPT

## 2020-11-05 PROCEDURE — 99214 OFFICE O/P EST MOD 30 MIN: CPT | Performed by: PSYCHIATRY & NEUROLOGY

## 2020-11-05 PROCEDURE — 80156 ASSAY CARBAMAZEPINE TOTAL: CPT

## 2020-11-05 PROCEDURE — 36415 COLL VENOUS BLD VENIPUNCTURE: CPT

## 2020-11-05 PROCEDURE — 3017F COLORECTAL CA SCREEN DOC REV: CPT | Performed by: PSYCHIATRY & NEUROLOGY

## 2020-11-05 PROCEDURE — G8417 CALC BMI ABV UP PARAM F/U: HCPCS | Performed by: PSYCHIATRY & NEUROLOGY

## 2020-11-05 PROCEDURE — G8432 DEP SCR NOT DOC, RNG: HCPCS | Performed by: PSYCHIATRY & NEUROLOGY

## 2020-11-05 PROCEDURE — 85025 COMPLETE CBC W/AUTO DIFF WBC: CPT

## 2020-11-05 PROCEDURE — G8427 DOCREV CUR MEDS BY ELIG CLIN: HCPCS | Performed by: PSYCHIATRY & NEUROLOGY

## 2020-11-05 PROCEDURE — 80053 COMPREHEN METABOLIC PANEL: CPT

## 2020-11-05 RX ORDER — PRIMIDONE 250 MG/1
250 TABLET ORAL 3 TIMES DAILY
COMMUNITY
End: 2021-05-24 | Stop reason: SDUPTHER

## 2020-11-05 NOTE — PROGRESS NOTES
Danni Reddy is a 62 y.o. female who presents today for the following:  Chief Complaint   Patient presents with    Seizure     f/u         HPI  Historical Data  Patient is known to the practice  Danni Reddy is a female with history of epilepsy and congenital encephalopathy  Patient is left-handed     Patient was initially seen by Dr. Noble Yen (neurology) 3/2/2018 for epilepsy. Note mentions patient having 5-6 seizures per month. History of congenital static encephalopathy. Lives with her mother. Previously being followed by a physician in [place]. Having seizures since age 7 months. Patient is on primidone 250 mg TID and Tegretol 400 mg twice daily. Tegretol is brand-name only as she has significant increase in seizures on generic to almost daily; prescription states TID   Primidone level was 5.4 and phenobarbital derivative was 9. Carbamazepine level was 8.6. Primidone 50 mg twice daily was added to her regimen. Patient was seen on follow-up 9/7/2018 and note mentions patient doing well since primidone was increased. Patient still having breakthrough seizures several times a month which composed of contortion of the upper body with arms rising and head turning and making noises. No loss of consciousness and it does not generalized. Rubs hands together or stand still w starring lasts several mins and is postictal for a bit \"makes her tired\" and she knows everything going on around her     Plan was to continue her current regiment of 300 mg of primidone 3 times a day and carbamazepine 400 mg twice daily. Primidone level was slightly elevated at 12.2 and phenobarbital level was 30. Patient was then seen by Dr. Noble Yen 5/3/2019 and note mentions patient having no breakthrough seizures since the last visit. Exam noted some unsteadiness to her gait and nystagmus on lateral gaze.   Labs done revealed elevated carbamazepine level at 13, elevated primidone level at 19.2 and phenobarbital level of 31. Patient was told to decrease her 50 mg primidone to twice daily instead of 3 times daily.     She can still have spells at most 5-6 and a month. She continues to take carbamazepine 200 mg, 2 tabs  3 times a day and primidone 250 mg 3 times daily plus 50 mg twice daily. Denies any side effects. Interim Data:     Seizure disorder and static encephalopathy  Current AEDs  Tegretol [brand-name only] 200 mg 2 tablets 3 times a day  Primidone 250 mg 1 tablet 3 times a day  Primidone 50 mg 1 tablet twice a day    Unchanged from above    Patient sister who is here with her today states everything is doing well and having no problems    Patient states that she has had 2 seizures  She states that she just feels different but continues to watch TV knows what is going on can respond when someone talks to her  No postictal state reported  Sister is unfamiliar with these events            Allergies   Allergen Reactions    Doxycycline Nausea and Vomiting       Current Outpatient Medications   Medication Sig    primidone (MYSOLINE) 250 mg tablet Take 250 mg by mouth three (3) times daily.  TEGretoL 200 mg tablet Take 2 Tabs by mouth three (3) times daily. Brand-name medically necessary  Indications: convulsive seizures    primidone (MYSOLINE) 50 mg tablet TAKE 1 TAB BY MOUTH TWICE DAILY INDICATIONS: EPILEPSY    Estrace 0.01 % (0.1 mg/gram) vaginal cream INSERT 1/2 GM VAGINALLY TWICE WEEKLY     No current facility-administered medications for this visit.         Past Surgical History:   Procedure Laterality Date    HX TUBAL LIGATION         Family History   Problem Relation Age of Onset    No Known Problems Mother     No Known Problems Father        Social History     Socioeconomic History    Marital status: UNKNOWN     Spouse name: Not on file    Number of children: Not on file    Years of education: Not on file    Highest education level: Not on file   Tobacco Use    Smoking status: Never Smoker    Smokeless tobacco: Never Used   Substance and Sexual Activity    Alcohol use: No         ROS  Other than what is stated above under HPI there is no new or changing issues related to the following:  Constitutional: No recent weight change, fever,fatigue, sleep difficulties, or loss of appetite. ENT/Mouth:  No hearing loss, ringing in the ears, chronic sinus problem, nose bleeds sore throat, voice change, hoarseness, swollen glands in neck, or difficulties with chewing and swallowing. Cardiovascular:  No chest pain/angina pectoris, palpitations,swelling of feet/ankles/hands, or calf pain while walking. Respiratory: No chronic or frequent coughs, spitting up blood, shortness of breath, asthma, or wheezing. Gastrointestinal: No abdominal pain, heartburn, nausea, vomiting, constipation, frequent diarrhea, rectal bleeding, or blood in stool. Genitourinary: No frequent urination, burning or painful urination, blood in urine, incontinence or dribbling. Musculoskeletal:   No joint pain, stiffness/swelling, weakness of muscles, muscle pain/cramp, or back pain. Integument:   No rash/itching, change in skin color, change in hair/nails, or change in color/size of moles. Neurological:  No dizziness/vertigo, loss of consciousness, numbness/tingling sensation, tremors, weakness in limbs, difficulty with balance, frequent or recurring headaches, memory loss or confusion. Psychiatric:   No nervousness, depression, hallucinations, paranoia or suspiciousness. Endocrine: No excessive thirst or urination, heat or cold intolerance. Hematologic/Lymphatic: No bleeding/bruising tendency, phlebitis, or past transfusion.        EXAMINATION:     EXAMINATION  Visit Vitals  BP (!) 156/80 (BP 1 Location: Left arm, BP Patient Position: Sitting)   Pulse 95   Temp 97.9 °F (36.6 °C) (Oral)   Resp 18   Ht 5' 1\" (1.549 m)   Wt 176 lb (79.8 kg)   SpO2 98%   BMI 33.25 kg/m²        General:   General appearance: Patient is well-developed and well-nourished in no apparent distress and well groomed. Affect: Appropriate and in good spirits    Neurological Examination:   Mental Status:    Formal testing was not completed  Within the context of the patient's baseline cognitive basis:   there was nothing concerning on general observation and discussion. Speech was fluid. Could follow normal conversation with normal speed and processing. Was able to answer questions, voiced concerns, discuss current events without difficulty. Cranial Nerves:  PERRLA, Extraocular movements are full. Gaze is conjugate. Facial sensation intact V1- V3. Facial movement intact, symmetric. Hearing intact to conversation. Voice is strong without evidence of secretion pooling, palate elevates symmetrically. Shoulder shrug symmetric. Tongue midline. Motor:   Difficulty with fine dexterity movements on the right side and some mild weakness on the right side baseline for patient  Left side with normal functioning movement tone and bulk  No tremor appreciated      Sensation: Light touch - Normal    Coordination/Cerebellar: Good truncal stability    Gait: Ambulates independently. Reflexes: Brisk and symmetrical    PHQ  3 most recent PHQ Screens 5/4/2020   Little interest or pleasure in doing things Several days   Feeling down, depressed, irritable, or hopeless Several days   Total Score PHQ 2 2         Fall risk assessment  No flowsheet data found. ASSESSMENT AND PLAN  Seizure disorder: Stable and unchanged over time. Patient is on AED therapy polypharmacy: High risk therapy  Therapeutic levels and monitoring need to be completed lab work will be ordered     Continue on all medications unchanged        ICD-10-CM ICD-9-CM    1.  Congenital encephalopathy (HCC)  Q07.9 742.9 CBC WITH AUTOMATED DIFF      METABOLIC PANEL, COMPREHENSIVE      CARBAMAZEPINE      PRIMIDONE (MYSOLINE)   2. Partial idiopathic epilepsy with seizures of localized onset, not intractable, without status epilepticus ([facility] [address]  G40.009 345.50 CBC WITH AUTOMATED DIFF      METABOLIC PANEL, COMPREHENSIVE      CARBAMAZEPINE      PRIMIDONE (MYSOLINE)   3. Therapeutic drug monitoring  Z51.81 V58.83 CBC WITH AUTOMATED DIFF      METABOLIC PANEL, COMPREHENSIVE      CARBAMAZEPINE      PRIMIDONE (MYSOLINE)           Additional pertinent medical data reviewed at today's office visit:       No visits with results within 3 Month(s) from this visit. Latest known visit with results is:   Office Visit on 11/13/2019   Component Date Value    WBC 11/13/2019 5.6     RBC 11/13/2019 4.33     HGB 11/13/2019 13.9     HCT 11/13/2019 39.9     MCV 11/13/2019 92     MCH 11/13/2019 32.1     MCHC 11/13/2019 34.8     RDW 11/13/2019 13.1     PLATELET 56/44/7204 346     NEUTROPHILS 11/13/2019 50     Lymphocytes 11/13/2019 35     MONOCYTES 11/13/2019 8     EOSINOPHILS 11/13/2019 6     BASOPHILS 11/13/2019 1     ABS. NEUTROPHILS 11/13/2019 2.9     Abs Lymphocytes 11/13/2019 2.0     ABS. MONOCYTES 11/13/2019 0.5     ABS. EOSINOPHILS 11/13/2019 0.3     ABS. BASOPHILS 11/13/2019 0.0     IMMATURE GRANULOCYTES 11/13/2019 0     ABS. IMM. GRANS. 11/13/2019 0.0     Glucose 11/13/2019 76     BUN 11/13/2019 12     Creatinine 11/13/2019 0.88     GFR est non-AA 11/13/2019 73     GFR est AA 11/13/2019 84     BUN/Creatinine ratio 11/13/2019 14     Sodium 11/13/2019 142     Potassium 11/13/2019 4.5     Chloride 11/13/2019 105     CO2 11/13/2019 22     Calcium 11/13/2019 9.0     Protein, total 11/13/2019 7.5     Albumin 11/13/2019 4.1     GLOBULIN, TOTAL 11/13/2019 3.4     A-G Ratio 11/13/2019 1.2     Bilirubin, total 11/13/2019 <0.2     Alk.  phosphatase 11/13/2019 145*    AST (SGOT) 11/13/2019 16     ALT (SGPT) 11/13/2019 14     Carbamazepine 11/13/2019 9.7     Primidone, Serum 11/13/2019 15.8*    Phenobarbital 11/13/2019 34     VITAMIN D, 25-HYDROXY 11/13/2019 21.9*       XR Results (maximum last 3): Results from [place] encounter on 12/26/15   XR ELBOW LT MIN 3 V   XR SHOULDER LT AP/LAT MIN 2 V   Results from [place] encounter on 12/01/15   XR CHEST PA LAT       CT Results (maximum last 3): Results from [place] encounter on 05/17/18   CT ABD PELV W WO CONT    Impression  impression: Gallstones, obstructing left renal calculus. Uterine mass likely  small fibroid. MRI Results (maximum last 3): No results found for this or any previous visit. Follow-up and Dispositions    · Return in about 1 year (around 11/5/2021).      Telephone visit length of time: 13 minutes and 30 seconds      Kevin Reeder MS, ANP-BC, [facility]

## 2020-11-05 NOTE — PATIENT INSTRUCTIONS
Continue on all your routine medications for seizure management Your Tegretol prescription brand-name only should be available in your pharmacy I sent that October 27, 2020 Please set up my chart that is the most efficient way to communicate with us especially in this time Covid and all the virtual visits that we are doing Continue to follow CDC recommendations and guidelines related to Covid Have a great holiday season Learning About Epilepsy What is epilepsy? Epilepsy is a common condition that causes repeated seizures. Seizures may cause problems with muscle control, movement, speech, vision, or awareness. They usually don't last very long, but they can be scary. Treatment usually works to control and reduce seizures. What causes it? Many things can cause epilepsy. It may develop as a result of a head injury or a condition that causes damage to the brain, like a tumor or stroke. Genes may also play a role. But you don't have to have a family history to develop it. Often doctors don't know what causes epilepsy. What are the symptoms? The main symptom of epilepsy is repeated seizures that happen without warning. There are different kinds of seizures. You may notice strange smells or sounds. You may lose control of your muscles. Or your body may twitch or jerk. Your symptoms will depend on the type of seizure you have. How is it diagnosed? Diagnosing epilepsy can be hard. Your doctor will ask questions to find out what happened just before, during, and right after a seizure. Your doctor will examine you. You'll have some tests, such as an electroencephalogram. This information can help your doctor decide what kind of seizures you have and if you have epilepsy. How is it treated? You can take medicines to control and reduce seizures. Which type you use depends on the type of seizure.  You and your doctor will need to find the right combination, schedule, and dose of medicine. If medicine alone doesn't help, your doctor may suggest a special diet or surgery to help reduce seizures. How can you care for yourself at home? To control your seizures, you need to follow your treatment plan. If you take medicine to control seizures, you must take it exactly as prescribed. The medicine works only if you take the right amount on the schedule your doctor sets up. Following this schedule keeps the right level of medicine in your body. Even missing just a few doses can allow seizures to happen. You might be on a special ketogenic diet. If so, you'll need to follow the diet exactly for it to help prevent seizures. As you follow your treatment plan, also try to figure out and avoid things that may make you more likely to have a seizure. These may include: · Not getting enough sleep. · Using drugs or alcohol. · Being stressed. · Skipping meals. If you keep having seizures despite treatment, keep a record of them. Note the date, time of day, and any details about the seizure that you can remember. Your doctor can use this information to plan or adjust your medicine or other treatment. The record can also help your doctor find out what kinds of seizures you are having. If you have epilepsy: · Be sure that any doctor who treats you knows that you have epilepsy. And let the doctor know what medicines you take, if any. · Wear a medical ID bracelet. If you have a seizure or accident that leaves you unconscious or unable to speak for yourself, the bracelet will let those who are treating you know that you have epilepsy. It will also list any medicines you take to control your seizures. That way, you won't be given any medicines that will react badly with those already in your body. · Ask your doctor if it's safe for you to do things like drive or swim. · Create a seizure first-aid plan with your friends and family.  The plan will help them know how to help you. The kind of plan you need can depend on the kind of seizures you have. Your doctor can tell you more about this. Follow-up care is a key part of your treatment and safety. Be sure to make and go to all appointments, and call your doctor if you are having problems. It's also a good idea to know your test results and keep a list of the medicines you take. Where can you learn more? Go to http://www.gray.com/ Enter [phone] in the search box to learn more about \"Learning About Epilepsy. \" Current as of: November 20, 2019               Content Version: 12.6 © 4960-4743 [employer]. Care instructions adapted under license by [employer] (which disclaims liability or warranty for this information). If you have questions about a medical condition or this instruction, always ask your healthcare professional. [place] [address] any warranty or liability for your use of this information. Office Policies 
 
o Phone calls/patient messages: Please allow up to 24 hours for someone in the office to contact you about your call or message. Be mindful your provider may be out of the office or your message may require further review. We encourage you to use [employer] for your messages as this is a faster, more efficient way to communicate with our office 
 
o Medication Refills: 
Prescription medications require up to 48 business hours to process. We encourage you to use [employer] for your refills. For controlled medications: Please allow up to 72 business hours to process. Certain medications may require you to  a written prescription at our office. NO narcotic/controlled medications will be prescribed after 4pm Monday through Friday or on weekends 
 
o Form/Paperwork Completion: We ask that you allow 7-14 business days. You may also download your forms to [employer] to have your doctor print off. And I guess Marily Strong is aware                                                                                                                                                                                                                                                                                                                                                                            just a noon appointment for a year office I did not print up the AVS you can put the appointment in the office AVS for now

## 2020-11-07 LAB
ALBUMIN SERPL-MCNC: 3.8 G/DL (ref 3.8–4.9)
ALBUMIN/GLOB SERPL: 1 {RATIO} (ref 1.2–2.2)
ALP SERPL-CCNC: 123 IU/L (ref 39–117)
ALT SERPL-CCNC: 13 IU/L (ref 0–32)
AST SERPL-CCNC: 15 IU/L (ref 0–40)
BASOPHILS # BLD AUTO: 0 X10E3/UL (ref 0–0.2)
BASOPHILS NFR BLD AUTO: 0 %
BILIRUB SERPL-MCNC: <0.2 MG/DL (ref 0–1.2)
BUN SERPL-MCNC: 13 MG/DL (ref 6–24)
BUN/CREAT SERPL: 15 (ref 9–23)
CALCIUM SERPL-MCNC: 8.9 MG/DL (ref 8.7–10.2)
CARBAMAZEPINE SERPL-MCNC: 7.4 UG/ML (ref 4–12)
CHLORIDE SERPL-SCNC: 104 MMOL/L (ref 96–106)
CO2 SERPL-SCNC: 23 MMOL/L (ref 20–29)
CREAT SERPL-MCNC: 0.85 MG/DL (ref 0.57–1)
EOSINOPHIL # BLD AUTO: 0.2 X10E3/UL (ref 0–0.4)
EOSINOPHIL NFR BLD AUTO: 3 %
ERYTHROCYTE [DISTWIDTH] IN BLOOD BY AUTOMATED COUNT: 12.3 % (ref 11.7–15.4)
GLOBULIN SER CALC-MCNC: 3.7 G/DL (ref 1.5–4.5)
GLUCOSE SERPL-MCNC: 89 MG/DL (ref 65–99)
HCT VFR BLD AUTO: 35 % (ref 34–46.6)
HGB BLD-MCNC: 11.9 G/DL (ref 11.1–15.9)
IMM GRANULOCYTES # BLD AUTO: 0 X10E3/UL (ref 0–0.1)
IMM GRANULOCYTES NFR BLD AUTO: 0 %
LYMPHOCYTES # BLD AUTO: 1.4 X10E3/UL (ref 0.7–3.1)
LYMPHOCYTES NFR BLD AUTO: 23 %
MCH RBC QN AUTO: 31.5 PG (ref 26.6–33)
MCHC RBC AUTO-ENTMCNC: 34 G/DL (ref 31.5–35.7)
MCV RBC AUTO: 93 FL (ref 79–97)
MONOCYTES # BLD AUTO: 0.5 X10E3/UL (ref 0.1–0.9)
MONOCYTES NFR BLD AUTO: 9 %
NEUTROPHILS # BLD AUTO: 4 X10E3/UL (ref 1.4–7)
NEUTROPHILS NFR BLD AUTO: 65 %
PHENOBARB SERPL-MCNC: 36 UG/ML (ref 15–40)
PLATELET # BLD AUTO: 218 X10E3/UL (ref 150–450)
POTASSIUM SERPL-SCNC: 4 MMOL/L (ref 3.5–5.2)
PRIMIDONE SERPL-MCNC: 12.8 UG/ML (ref 5–12)
PROT SERPL-MCNC: 7.5 G/DL (ref 6–8.5)
RBC # BLD AUTO: 3.78 X10E6/UL (ref 3.77–5.28)
SODIUM SERPL-SCNC: 140 MMOL/L (ref 134–144)
WBC # BLD AUTO: 6.1 X10E3/UL (ref 3.4–10.8)

## 2021-03-24 DIAGNOSIS — G40.309 NONINTRACTABLE GENERALIZED IDIOPATHIC EPILEPSY WITHOUT STATUS EPILEPTICUS (HCC): ICD-10-CM

## 2021-03-24 RX ORDER — PRIMIDONE 50 MG/1
TABLET ORAL
Qty: 180 TAB | Refills: 3 | Status: SHIPPED | OUTPATIENT
Start: 2021-03-24 | End: 2021-05-24 | Stop reason: SDUPTHER

## 2021-05-04 ENCOUNTER — TELEPHONE (OUTPATIENT)
Dept: NEUROLOGY | Age: 61
End: 2021-05-04

## 2021-05-04 NOTE — TELEPHONE ENCOUNTER
Called Arturo back. She stated the pharmacy answered her questions about the medication however she stated that she needs the hard copy of each medication that you prescribe to the pt so they have that on file and stated that she needs a seizure protocol on file. If these could be written I can fax them to [address] [place] [address] [address] [phone].

## 2021-05-04 NOTE — TELEPHONE ENCOUNTER
----- Message from Magalis washington sent at 5/4/2021  2:31 PM EDT -----  Regarding: MAZIN Valle/Telephone     Caller's first and last name: Nereida-[facility]  Reason for call:questions for meds  Callback required yes/no and why:Yes  Best contact number(s):[phone]  Details to clarify the request:Nereida has questions on how to administer the medications for the patient.

## 2021-05-06 ENCOUNTER — TELEPHONE (OUTPATIENT)
Dept: NEUROLOGY | Age: 61
End: 2021-05-06

## 2021-05-06 NOTE — TELEPHONE ENCOUNTER
----- Message from Delmar Leon sent at 5/6/2021 11:11 AM EDT -----  Regarding: SHUN Valle/Telephone  Contact: [phone]  General Message/Vendor Calls    Caller's first and last name:Binta, [facility]. Reason for call:Please fax over the pt's orders for the pt. A message was sent on 5/3 but still has not gotten a response. Callback required yes/no and why:Yes, confirm fax has sent.        Best contact number(s):[phone] ext [phone]      Details to clarify the request:n/a      Delmar Leon

## 2021-05-07 NOTE — TELEPHONE ENCOUNTER
Cierra Mckeon please look in the patient's media tab and see if what ever paperwork they are discussing has been sent back.   Or call this person and find out what paperwork needs to be done if there is nothing in her chart thank you

## 2021-05-07 NOTE — TELEPHONE ENCOUNTER
The paperwork they are referring to is the seizure protocol and the hard copy of the prescriptions. Nothing else needs to be done. Unless you would like me to send a seizure protocol just in case something happens so they have it on file.

## 2021-05-07 NOTE — TELEPHONE ENCOUNTER
So I just responded back to you on this patient in the previous note but I see now that you have talked to [facility] so is or anything else we need to do?

## 2021-05-07 NOTE — TELEPHONE ENCOUNTER
Called Eliseo Pond back and told her that pt needs to make an appointment to come in. She stated she will forward to the team that makes their appointments and they will call to make one. Call from yesterday but pertaining to original call. Copy and pasted to keep all in one message. ----- Message from Jared Mario sent at 5/6/2021 11:11 AM EDT -----  Regarding: SHUN Valle/Telephone  Contact: [phone]  General Message/Vendor Calls     Caller's first and last name:[facility].        Reason for call:Please fax over the pt's orders for the pt.   A message was sent on 5/3 but still has not gotten a response.         Callback required yes/no and why:Yes, confirm fax has sent.         Best contact number(s):[phone]        Details to clarify the request:n/a        Jared Mario

## 2021-05-24 ENCOUNTER — OFFICE VISIT (OUTPATIENT)
Dept: NEUROLOGY | Age: 61
End: 2021-05-24
Payer: MEDICARE

## 2021-05-24 VITALS
HEART RATE: 86 BPM | SYSTOLIC BLOOD PRESSURE: 126 MMHG | WEIGHT: 170.8 LBS | BODY MASS INDEX: 32.27 KG/M2 | DIASTOLIC BLOOD PRESSURE: 76 MMHG | OXYGEN SATURATION: 97 %

## 2021-05-24 DIAGNOSIS — G40.309 NONINTRACTABLE GENERALIZED IDIOPATHIC EPILEPSY WITHOUT STATUS EPILEPTICUS (HCC): ICD-10-CM

## 2021-05-24 DIAGNOSIS — Z51.81 THERAPEUTIC DRUG MONITORING: ICD-10-CM

## 2021-05-24 DIAGNOSIS — G40.009 PARTIAL IDIOPATHIC EPILEPSY WITH SEIZURES OF LOCALIZED ONSET, NOT INTRACTABLE, WITHOUT STATUS EPILEPTICUS (HCC): ICD-10-CM

## 2021-05-24 DIAGNOSIS — G40.019 LOCALIZATION-RELATED (FOCAL) (PARTIAL) IDIOPATHIC EPILEPSY AND EPILEPTIC SYNDROMES WITH SEIZURES OF LOCALIZED ONSET, INTRACTABLE, WITHOUT STATUS EPILEPTICUS (HCC): ICD-10-CM

## 2021-05-24 DIAGNOSIS — Q07.9 CONGENITAL ENCEPHALOPATHY (HCC): ICD-10-CM

## 2021-05-24 DIAGNOSIS — R56.9 SEIZURES (HCC): Primary | ICD-10-CM

## 2021-05-24 PROCEDURE — G8417 CALC BMI ABV UP PARAM F/U: HCPCS | Performed by: PSYCHIATRY & NEUROLOGY

## 2021-05-24 PROCEDURE — 3017F COLORECTAL CA SCREEN DOC REV: CPT | Performed by: PSYCHIATRY & NEUROLOGY

## 2021-05-24 PROCEDURE — G8432 DEP SCR NOT DOC, RNG: HCPCS | Performed by: PSYCHIATRY & NEUROLOGY

## 2021-05-24 PROCEDURE — G8427 DOCREV CUR MEDS BY ELIG CLIN: HCPCS | Performed by: PSYCHIATRY & NEUROLOGY

## 2021-05-24 PROCEDURE — 99215 OFFICE O/P EST HI 40 MIN: CPT | Performed by: PSYCHIATRY & NEUROLOGY

## 2021-05-24 RX ORDER — DOXYCYCLINE 100 MG/1
CAPSULE ORAL
COMMUNITY
Start: 2021-05-20 | End: 2021-12-16

## 2021-05-24 RX ORDER — ERGOCALCIFEROL 1.25 MG/1
CAPSULE ORAL
COMMUNITY
Start: 2021-03-01

## 2021-05-24 RX ORDER — CARBAMAZEPINE 200 MG/1
400 TABLET ORAL 3 TIMES DAILY
Qty: 90 TABLET | Refills: 12 | Status: SHIPPED | OUTPATIENT
Start: 2021-05-24 | End: 2021-11-29 | Stop reason: SDUPTHER

## 2021-05-24 RX ORDER — PRIMIDONE 250 MG/1
250 TABLET ORAL 3 TIMES DAILY
Qty: 90 TABLET | Refills: 3 | Status: SHIPPED | OUTPATIENT
Start: 2021-05-24 | End: 2021-11-29 | Stop reason: SDUPTHER

## 2021-05-24 RX ORDER — FUROSEMIDE 20 MG/1
TABLET ORAL
COMMUNITY
Start: 2021-02-15

## 2021-05-24 RX ORDER — ATORVASTATIN CALCIUM 20 MG/1
20 TABLET, FILM COATED ORAL DAILY
COMMUNITY
Start: 2021-05-21

## 2021-05-24 RX ORDER — FAMOTIDINE 20 MG/1
TABLET, FILM COATED ORAL
COMMUNITY
Start: 2021-05-20 | End: 2022-08-09

## 2021-05-24 RX ORDER — PRIMIDONE 50 MG/1
TABLET ORAL
Qty: 90 TABLET | Refills: 12 | Status: SHIPPED | OUTPATIENT
Start: 2021-05-24 | End: 2021-12-30

## 2021-05-24 NOTE — LETTER
5/24/2021 11:31 AM 
 
Ms. Amanda Vivas [address] [facility] [id number] Seizure protocol for potential breakthrough seizures If patient has partial complex seizure consisting of staring spells without any cardiac or pulmonary compromise and last 2 minutes or less no action is needed except to notify our office If she has a generalized tonic-clonic seizure she will need to go to the emergency room since this is not typical of her seizure presentations and she will need to be reevaluated If it anytime she has cardiac or pulmonary compromise she will need to go to the emergency department Sincerely, Barney Kumar NP

## 2021-05-24 NOTE — PATIENT INSTRUCTIONS
As per discussion    Regarding timing of medications  8-2-8 schedule is appropriate for 3 times a day dosing  And an 8-8 schedule for twice a day dosing  It is okay to give the medications up to an hour before the designated timeframe through an hour after the designated timeframe    All prescriptions were printed out with a month supply and refills x1 year    Seizure protocol was also drafted and given at today's office visit    I strongly encourage you to use my chart if you need to contact us. Presently with a high utilization of telehealth it is very difficult to get through on her phone lines. If you have not already activated my chart or you forget your password their instructions in this packet to get my chart activated. Office Policies    o Phone calls/patient messages:  Please allow up to 24 hours for someone in the office to contact you about your call or message. Be mindful your provider may be out of the office or your message may require further review. We encourage you to use [employer] for your messages as this is a faster, more efficient way to communicate with our office    o Medication Refills:  Prescription medications require up to 48 business hours to process. We encourage you to use [employer] for your refills. For controlled medications: Please allow up to 72 business hours to process. Certain medications may require you to  a written prescription at our office. NO narcotic/controlled medications will be prescribed after 4pm Monday through Friday or on weekends    o Form/Paperwork Completion:  We ask that you allow 7-14 business days. You may also download your forms to [employer] to have your doctor print off.

## 2021-05-24 NOTE — LETTER
5/24/2021 11:34 AM 
 
Ms. Yolis Banks [address] [facility] [id number] Seizure protocol for potential breakthrough seizures If the patient has a partial complex seizure  without pulmonary or cardiac compromise and last less than or equal to 2 minutes no action is necessary except to contact and notify our office If the patient has a generalized tonic-clonic seizure since this is atypical for her presentation she will need to go to the emergency department With any seizure if she has cardiac or pulmonary compromise she will need to go to the emergency department Sincerely, Christal Capps NP

## 2021-05-24 NOTE — PROGRESS NOTES
[facility] 84  In Office FOLLOW-UP VISIT         Juliano Toribio is a 62 y.o. female who presents today for the following:  Chief Complaint   Patient presents with    Seizure     f/u -- needs hard copy of medication and seizure protocol-- question about dosage on primidone         ASSESSMENT AND PLAN  Seizure disorder reasonably well controlled she must be on name brand Tegretol otherwise she has breakthrough seizures and she is on Mysoline 250 mg 3 times daily along with Mysoline 50 mg twice daily    Therapeutic drug monitoring has been ordered today    Seizure protocol written and given in a letter format to the   New prescriptions for her medications have been ordered today since there is been a change in pharmacy and hard copies were written for the records and they can then fax them to the pharmacy afterwards    Additionally patient has been told and it was written in her AVS that the 8-2-8 medication scheduling is appropriate and that the medications can be given within an hour prior to that time to an hour after that time        ICD-10-CM ICD-9-CM    1. Seizures ([facility]  R56.9 780.39 TEGretoL 200 mg tablet   2. Nonintractable generalized idiopathic epilepsy without status epilepticus ([facility]  G40.309 345.90 primidone (MYSOLINE) 50 mg tablet   3. Therapeutic drug monitoring  Z51.81 V58.83 PRIMIDONE (MYSOLINE)      CARBAMAZEPINE      CBC WITH AUTOMATED DIFF      METABOLIC PANEL, COMPREHENSIVE   4. Congenital encephalopathy (HCC)  Q07.9 742.9    5. Partial idiopathic epilepsy with seizures of localized onset, not intractable, without status epilepticus ([facility]  G40.009 345.50    6.  Localization-related (focal) (partial) idiopathic epilepsy and epileptic syndromes with seizures of localized onset, intractable, without status epilepticus ([facility]  G40.019 345.51          I attest that 40 minutes was spent on today's visit reviewing medical records and diagnostic testing deemed pertinent to this patient's care, along with direct time spent at patient's visit including the history, physical assessment and plan, discussing diagnosis and management along with documentation. HPI  Historical Data  Historical Data  Patient is known to the practice  Frankie Arias a female with history of epilepsy and congenital encephalopathy     Patient was initially seen by Dr. Yrui Woodard (neurology) 3/2/2018 for epilepsy. Jraett Marie mentions patient having 5-6 seizures per month.  History of congenital static encephalopathy. Tamika Arrant with her mother. Papito Fournier being followed by a physician in [address] seizures since age 7 months.       Patient is on primidone 250 mg TID and Tegretol 400 mg twice daily. Tegretol is brand-name only as she has significant increase in seizures on generic to almost daily; prescription states TID   Primidone level was 5.4 and phenobarbital derivative was 9.  Carbamazepine level was 8.6.    Primidone 50 mg twice daily was added to her regimen.       Patient was seen on follow-up 9/7/2018 and note mentions patient doing well since primidone was increased.       Patient still having breakthrough seizures several times a month which composed of contortion of the upper body with arms rising and head turning and making noises.  No loss of consciousness and it does not generalized.      Rubs hands together or stand still w starring lasts several mins and is postictal for a bit \"makes her tired\" and she knows everything going on around her      Plan was to continue her current regiment of 300 mg of primidone 3 times a day and carbamazepine 400 mg twice daily.    Primidone level was slightly elevated at 12.2 and phenobarbital level was 30.       Patient was then seen by Dr. Yuri Woodard 5/3/2019 and note mentions patient having no breakthrough seizures since the last visit.  Exam noted some unsteadiness to her gait and nystagmus on lateral gaze.  Labs done revealed elevated carbamazepine level at 13, elevated primidone level at 19.2 and phenobarbital level of 31.  Patient was told to decrease her 50 mg primidone to twice daily instead of 3 times daily.     She can still have spells at most 5-6 and a month.  She continues to take carbamazepine 200 mg, 2 tabs  3 times a day and primidone 250 mg 3 times daily plus 50 mg twice daily.  Denies any side effects.     Interim Data:   Jacobo Cadena:   Rhina: sister    She is now living in a residential facility  She is new to the facility  Seizure protocol and medication hardcopies need to be obtained  There is some concern about the timing of her medication none that she is in a new facility and the patient is anxious because the timing is a bit different   Per the  her medications are given on an 8--2-8 schedule    Patient gets upset if they are not given exactly on time    There have been no seizure or seizure-like events  She remains adherent to her medications  No evidence of malabsorption issues or side effects            No results found for this or any previous visit. Allergies   Allergen Reactions    Doxycycline Nausea and Vomiting    Indomethacin Other (comments)     Loopy and out of her mind    Paroxetine Other (comments)     Head shakes      Sulfa (Sulfonamide Antibiotics) Other (comments)     Never taken       Current Outpatient Medications   Medication Sig    furosemide (LASIX) 20 mg tablet TAKE 1 2 TABLET BY MOUTH EVERY DAY AS NEEDED FOR SWELLING    famotidine (PEPCID) 20 mg tablet     ergocalciferol (ERGOCALCIFEROL) 1,250 mcg (50,000 unit) capsule TAKE 1 CAPSULE BY MOUTH ONE TIME PER WEEK    doxycycline (VIBRAMYCIN) 100 mg capsule     atorvastatin (LIPITOR) 20 mg tablet     primidone (MYSOLINE) 250 mg tablet Take 1 Tablet by mouth three (3) times daily.  TEGretoL 200 mg tablet Take 2 Tablets by mouth three (3) times daily.  Brand-name medically necessary  Indications: convulsive seizures    primidone (MYSOLINE) 50 mg tablet TAKE 1 TAB BY MOUTH TWICE DAILY  Indications: convulsive seizures    Estrace 0.01 % (0.1 mg/gram) vaginal cream INSERT 1/2 GM VAGINALLY TWICE WEEKLY (Patient not taking: Reported on 5/24/2021)     No current facility-administered medications for this visit. Past medical history/surgical history, family history, and social history have been reviewed for today's visit      ROS    A ten system review of constitutional, cardiovascular, respiratory, musculoskeletal, endocrine, skin, SHEENT, genitourinary, psychiatric and neurologic systems was obtained and is unremarkable except as mentioned under HPI          EXAMINATION:     Visit Vitals  /76   Pulse 86   Wt 170 lb 12.8 oz (77.5 kg)   SpO2 97%   BMI 32.27 kg/m²         General appearance: Patient is well-developed and well-nourished in no apparent distress and well groomed. Psych/mental health:  Affect: Appropriate    PHQ  3 most recent PHQ Screens 5/4/2020   Little interest or pleasure in doing things Several days   Feeling down, depressed, irritable, or hopeless Several days   Total Score PHQ 2 2       HEENT:   Normocephalic  With evidence of trauma: No  Full range of motion head neck: Yes  Tenderness to palpation of the head neck region: No      Cardiovascular:     Extremities warm to touch: Yes  Extremity swelling: No  Discoloration: No  Evidence of PVD: No    Respiratory:   Dyspnea on exertion: No   Abnormal effort on casual observation: No   Use of portable oxygen: No   Evidence of cyanosis: No     Musculoskeletal:   Evidence of significant bone deformities: No   Spinal curvature: No     Integumentary:    Obvious bruising: No   Lacerations or discoloration on casual observation: No       Neurological Examination:   Mental Status:        MMSE  No flowsheet data found.       Formal testing was not completed  Within the context of the patient's baseline cognitive basis:   there was nothing concerning on general observation and discussion. Speech was fluid. Could follow normal conversation with normal speed and processing. Was able to answer questions, voiced concerns, discuss current events without difficulty. Cranial Nerves:    Grossly intact    Motor:   Normal bulk  No tremor appreciated on today's exam  No abnormal movements appreciated on today's exam  Moves extremities spontaneously and with purpose        Sensation: Intact to light touch    Coordination/Cerebellar:   Grossly intact    Gait: Ambulates independently    Reflexes:  Symmetrical    Fall risk assessment  No flowsheet data found. Follow-up and Dispositions    · Return in about 1 year (around 5/24/2022) for In office appointment.            Zahra Hodges MS, ANP-BC, [facility]

## 2021-05-24 NOTE — LETTER
5/24/2021 11:32 AM 
 
Ms. Deyvi Douglass [address] [place] [id number] Sincerely, Griselda Felipe NP

## 2021-09-20 ENCOUNTER — TELEPHONE (OUTPATIENT)
Dept: NEUROLOGY | Age: 61
End: 2021-09-20

## 2021-09-20 DIAGNOSIS — R56.9 SEIZURES (HCC): Primary | ICD-10-CM

## 2021-09-20 DIAGNOSIS — Z51.81 THERAPEUTIC DRUG MONITORING: ICD-10-CM

## 2021-09-20 DIAGNOSIS — G40.309 NONINTRACTABLE GENERALIZED IDIOPATHIC EPILEPSY WITHOUT STATUS EPILEPTICUS (HCC): ICD-10-CM

## 2021-09-20 NOTE — TELEPHONE ENCOUNTER
As far as I know know it does not cause increased seizures  She can always check with her pharmacist for the facility where she needs further details  If there are problems?

## 2021-09-20 NOTE — TELEPHONE ENCOUNTER
Patients  for the supported  Living home called in regards to the patients medication for omeprazole, the nurse at the home would like to know if this medication causes increased seizure activity   Lisa Becker [phone]

## 2021-09-21 NOTE — TELEPHONE ENCOUNTER
Called and spoke with Cecilia Gonzalez. Stated [place]. She stated that ever since taking that medication the patient stated she has had a seizure every. She stated the only thing that has changed is starting that medication.

## 2021-09-21 NOTE — TELEPHONE ENCOUNTER
Please tell them that I do not even see omeprazole on my medication list.  Who prescribed it? Patient contact the prescriber if they think it needs to be discontinued. As far as I am aware there is absolutely no reason why this should cause an increase in seizures.     Additionally early sure she is actually having seizures or something else happening that might look like seizures    I am going to order blood work to assess her current levels and issues    I recommend that they have her primary care doctor take a look at her to make sure she does not have an infection somewhere that could be contributing to breakthrough seizures    I have no objection to discontinuing the omeprazole but they need to contact the doctor or nurse practitioner or PA who prescribed it and discuss it with them

## 2021-09-23 NOTE — TELEPHONE ENCOUNTER
Called and spoke with Magdalena Bernstein. Notified of Katie's message. Stated to give the office a calll with any other questions. She verbalized understanding.

## 2021-10-02 LAB
ALBUMIN SERPL-MCNC: 3.8 G/DL (ref 3.8–4.9)
ALBUMIN/GLOB SERPL: 1.1 {RATIO} (ref 1.2–2.2)
ALP SERPL-CCNC: 150 IU/L (ref 44–121)
ALT SERPL-CCNC: 21 IU/L (ref 0–32)
AST SERPL-CCNC: 23 IU/L (ref 0–40)
BASOPHILS # BLD AUTO: 0 X10E3/UL (ref 0–0.2)
BASOPHILS NFR BLD AUTO: 1 %
BILIRUB SERPL-MCNC: <0.2 MG/DL (ref 0–1.2)
BUN SERPL-MCNC: 11 MG/DL (ref 8–27)
BUN/CREAT SERPL: 14 (ref 12–28)
CALCIUM SERPL-MCNC: 8.6 MG/DL (ref 8.7–10.3)
CARBAMAZEPINE SERPL-MCNC: 9.7 UG/ML (ref 4–12)
CHLORIDE SERPL-SCNC: 104 MMOL/L (ref 96–106)
CO2 SERPL-SCNC: 26 MMOL/L (ref 20–29)
CREAT SERPL-MCNC: 0.8 MG/DL (ref 0.57–1)
EOSINOPHIL # BLD AUTO: 0.2 X10E3/UL (ref 0–0.4)
EOSINOPHIL NFR BLD AUTO: 4 %
ERYTHROCYTE [DISTWIDTH] IN BLOOD BY AUTOMATED COUNT: 12.5 % (ref 11.7–15.4)
GLOBULIN SER CALC-MCNC: 3.5 G/DL (ref 1.5–4.5)
GLUCOSE SERPL-MCNC: 73 MG/DL (ref 65–99)
HCT VFR BLD AUTO: 38.2 % (ref 34–46.6)
HGB BLD-MCNC: 12.6 G/DL (ref 11.1–15.9)
IMM GRANULOCYTES # BLD AUTO: 0 X10E3/UL (ref 0–0.1)
IMM GRANULOCYTES NFR BLD AUTO: 0 %
LYMPHOCYTES # BLD AUTO: 1.5 X10E3/UL (ref 0.7–3.1)
LYMPHOCYTES NFR BLD AUTO: 29 %
MCH RBC QN AUTO: 31.8 PG (ref 26.6–33)
MCHC RBC AUTO-ENTMCNC: 33 G/DL (ref 31.5–35.7)
MCV RBC AUTO: 97 FL (ref 79–97)
MONOCYTES # BLD AUTO: 0.5 X10E3/UL (ref 0.1–0.9)
MONOCYTES NFR BLD AUTO: 10 %
NEUTROPHILS # BLD AUTO: 2.9 X10E3/UL (ref 1.4–7)
NEUTROPHILS NFR BLD AUTO: 56 %
PHENOBARB SERPL-MCNC: 36 UG/ML (ref 15–40)
PLATELET # BLD AUTO: 190 X10E3/UL (ref 150–450)
POTASSIUM SERPL-SCNC: 4.8 MMOL/L (ref 3.5–5.2)
PRIMIDONE SERPL-MCNC: 14.8 UG/ML (ref 5–12)
PROT SERPL-MCNC: 7.3 G/DL (ref 6–8.5)
RBC # BLD AUTO: 3.96 X10E6/UL (ref 3.77–5.28)
SODIUM SERPL-SCNC: 140 MMOL/L (ref 134–144)
WBC # BLD AUTO: 5.1 X10E3/UL (ref 3.4–10.8)

## 2021-11-23 ENCOUNTER — TELEPHONE (OUTPATIENT)
Dept: NEUROLOGY | Age: 61
End: 2021-11-23

## 2021-11-23 NOTE — TELEPHONE ENCOUNTER
Larissa Villalpando/Caregiver called states that pt is having frequent falls. Ms. Mathew Grant states that family thinks that it maybe something neurological going on with her in her head.  Ms. Mathew Grant is not on pts HIPPA; however she will be with the pt around 12:30 today in order for pt to give permission to discuss her condition

## 2021-11-23 NOTE — TELEPHONE ENCOUNTER
called Kristie Moon at [phone] who is on her permission to release information. She is quite a few more falls starting about 3 weeks ago. She went to the PCP 11/18/2021 and had a negative work up. Please let me know if we can work the patient in somewhere you would like? The patient is living at [facility] which is the place that is calling. Samir Crystal is now the manager of [employer]    She was trying to make an appointment for the patient because of frequent falls. The patient had an appointment scheduled for 11/5/21, but the appointment was canceled by provider office. The next one set up is for 5/24/2022.  Last seen 5/24/2021

## 2021-11-29 DIAGNOSIS — R56.9 SEIZURES (HCC): ICD-10-CM

## 2021-11-30 RX ORDER — PRIMIDONE 250 MG/1
250 TABLET ORAL 3 TIMES DAILY
Qty: 90 TABLET | Refills: 3 | Status: SHIPPED | OUTPATIENT
Start: 2021-11-30 | End: 2022-02-22

## 2021-11-30 RX ORDER — CARBAMAZEPINE 200 MG/1
400 TABLET ORAL 3 TIMES DAILY
Qty: 180 TABLET | Refills: 12 | Status: SHIPPED | OUTPATIENT
Start: 2021-11-30

## 2021-12-14 ENCOUNTER — TELEPHONE (OUTPATIENT)
Dept: NEUROLOGY | Age: 61
End: 2021-12-14

## 2021-12-14 NOTE — TELEPHONE ENCOUNTER
Last office visit 5/24/2021  Last med refill  Tegretol 1130/21 180 for one month with 12 refills.   confrimed receipt  Primidone 50 5/24/2021 has 12 refills script is 1.5 months  Primidone 250 filled 11/30/2021 90 day supply plus 3 refills with confirmed receipt from pharmacy

## 2021-12-14 NOTE — TELEPHONE ENCOUNTER
Called to say that the patient needs rx refills on tegretol 200 mg, primidone 250 mg, and primidone 50 mg.

## 2021-12-16 ENCOUNTER — OFFICE VISIT (OUTPATIENT)
Dept: NEUROLOGY | Age: 61
End: 2021-12-16
Payer: MEDICARE

## 2021-12-16 VITALS
DIASTOLIC BLOOD PRESSURE: 79 MMHG | OXYGEN SATURATION: 96 % | TEMPERATURE: 97.9 F | WEIGHT: 168.2 LBS | HEART RATE: 75 BPM | HEIGHT: 61 IN | BODY MASS INDEX: 31.75 KG/M2 | SYSTOLIC BLOOD PRESSURE: 138 MMHG

## 2021-12-16 DIAGNOSIS — Q07.9 CONGENITAL ENCEPHALOPATHY (HCC): ICD-10-CM

## 2021-12-16 DIAGNOSIS — R56.9 SEIZURES (HCC): ICD-10-CM

## 2021-12-16 DIAGNOSIS — R42 DIZZY SPELLS: ICD-10-CM

## 2021-12-16 DIAGNOSIS — R29.6 FALLS FREQUENTLY: ICD-10-CM

## 2021-12-16 DIAGNOSIS — G40.009 PARTIAL IDIOPATHIC EPILEPSY WITH SEIZURES OF LOCALIZED ONSET, NOT INTRACTABLE, WITHOUT STATUS EPILEPTICUS (HCC): Primary | ICD-10-CM

## 2021-12-16 PROCEDURE — G8432 DEP SCR NOT DOC, RNG: HCPCS | Performed by: PSYCHIATRY & NEUROLOGY

## 2021-12-16 PROCEDURE — G8427 DOCREV CUR MEDS BY ELIG CLIN: HCPCS | Performed by: PSYCHIATRY & NEUROLOGY

## 2021-12-16 PROCEDURE — G8417 CALC BMI ABV UP PARAM F/U: HCPCS | Performed by: PSYCHIATRY & NEUROLOGY

## 2021-12-16 PROCEDURE — 99215 OFFICE O/P EST HI 40 MIN: CPT | Performed by: PSYCHIATRY & NEUROLOGY

## 2021-12-16 PROCEDURE — 3017F COLORECTAL CA SCREEN DOC REV: CPT | Performed by: PSYCHIATRY & NEUROLOGY

## 2021-12-16 RX ORDER — OMEPRAZOLE 20 MG/1
CAPSULE, DELAYED RELEASE ORAL
COMMUNITY
Start: 2021-11-26 | End: 2022-08-09

## 2021-12-16 RX ORDER — LOPERAMIDE HCL 2 MG
2 TABLET ORAL AS NEEDED
COMMUNITY
Start: 2021-09-17

## 2021-12-16 RX ORDER — PHOSPHORATED CARBOHYDRATE 1.87; 21.5; 1.87 G/5ML; MG/5ML; G/5ML
SOLUTION ORAL
COMMUNITY
Start: 2021-09-17

## 2021-12-16 RX ORDER — DICLOFENAC SODIUM 10 MG/G
2 GEL TOPICAL
COMMUNITY
Start: 2021-12-13

## 2021-12-16 RX ORDER — CETIRIZINE HCL 10 MG
10 TABLET ORAL DAILY
COMMUNITY
Start: 2021-11-18

## 2021-12-16 RX ORDER — HYDROXYZINE 25 MG/1
25 TABLET, FILM COATED ORAL
COMMUNITY
Start: 2021-11-19

## 2021-12-16 NOTE — PROGRESS NOTES
Rian 84  In Office FOLLOW-UP VISIT         Debbie Rooney is a 62 y.o. female who presents today for the following:  Chief Complaint   Patient presents with    Follow-up    Seizure         ASSESSMENT AND PLAN  1. Partial idiopathic epilepsy with seizures of localized onset, not intractable, without status epilepticus ([facility]  Assessment & Plan:  Stable continue on brand-name Tegretol along with Mysoline    We will check AED levels  2. Seizures (HCC)  -     CARBAMAZEPINE; Future  -     PRIMIDONE (MYSOLINE); Future  -     CBC WITH AUTOMATED DIFF; Future  -     METABOLIC PANEL, COMPREHENSIVE; Future  -     ECG HOLTER MONITOR, ANALYSIS 48 HR; Future  -     EEG; Future  -     MRI BRAIN W WO CONT; Future  3. Falls frequently  Assessment & Plan:   New onset problem within the past 4 to 6 months  Unclear etiology  Doubtful there is seizures but always possible  Could also be cardiac based and unclear of whether patient has any new cerebrovascular insult  I did not perceive any evidence that would be consistent with myelopathy so I do not feel like we need to check cervical MRI at this time  Patient has had no serious injury    We will check blood work to include AED levels  We will check brain MRI  We will check Holter monitor  We will check EEG    I recommended routine safety precautions but no recent to significantly restrict activities  Orders:  -     ECG HOLTER MONITOR, ANALYSIS 48 HR; Future  4. Dizzy spells  Assessment & Plan: To the best we can tell this is not related to her falls  We will check Holter monitor  Orders:  -     ECG HOLTER MONITOR, ANALYSIS 48 HR; Future  5. Congenital encephalopathy ([facility]  Assessment & Plan:  Stable and unchanged    Patient and/or family was given time to ask questions and voice concerns. I believe all questions concerns were adequately addressed at this  office visit.   Patient and/or family also verbalized agreement and understanding of the above-stated plan    Patient remains a complex patient secondary to polypharmacy, significant comorbid conditions, and use of high-risk medications which complicate the decision making process related to patient's neurologic diagnosis        ICD-10-CM ICD-9-CM    1. Partial idiopathic epilepsy with seizures of localized onset, not intractable, without status epilepticus ([facility]  G40.009 345.50    2. Seizures (HCC)  R56.9 780.39 CARBAMAZEPINE      PRIMIDONE (MYSOLINE)      CBC WITH AUTOMATED DIFF      METABOLIC PANEL, COMPREHENSIVE      ECG HOLTER MONITOR, ANALYSIS 48 HR      EEG      MRI BRAIN W WO CONT   3. Falls frequently  R29.6 V15.88 ECG HOLTER MONITOR, ANALYSIS 48 HR   4. Dizzy spells  R42 780.4 ECG HOLTER MONITOR, ANALYSIS 48 HR   5. Congenital encephalopathy (HCC)  Q07.9 742.9          I attest that 40 minutes was spent on today's visit reviewing medical records and diagnostic testing deemed pertinent to this patient's care, along with direct time spent at patient's visit including the history, physical assessment and plan, discussing diagnosis and management along with documentation. HPI  Historical Data  Historical Data  Patient is known to the practice  Isaac Barker a female with history of epilepsy and congenital encephalopathy     Patient was initially seen by Dr. Allison Bravo (neurology) 3/2/2018 for epilepsy. Milton Zapata mentions patient having 5-6 seizures per month.  History of congenital static encephalopathy. Antoinenora Montaño with her mother. Ruben Moreland being followed by a physician in [address] seizures since age 7 months.       Patient is on primidone 250 mg TID and Tegretol 400 mg twice daily.   Tegretol is brand-name only as she has significant increase in seizures on generic to almost daily; prescription states TID   Primidone level was 5.4 and phenobarbital derivative was 9.  Carbamazepine level was 8.6.    Primidone 50 mg twice daily was added to her regimen.       Patient was seen on follow-up 9/7/2018 and note mentions patient doing well since primidone was increased.       Patient still having breakthrough seizures several times a month which composed of contortion of the upper body with arms rising and head turning and making noises. No loss of consciousness and it does not generalized.      Rubs hands together or stand still w starring lasts several mins and is postictal for a bit \"makes her tired\" and she knows everything going on around her      Plan was to continue her current regiment of 300 mg of primidone 3 times a day and carbamazepine 400 mg twice daily.    Primidone level was slightly elevated at 12.2 and phenobarbital level was 30.       Patient was then seen by Dr. Farideh Raphael 5/3/2019 and note mentions patient having no breakthrough seizures since the last visit.  Exam noted some unsteadiness to her gait and nystagmus on lateral gaze.  Labs done revealed elevated carbamazepine level at 13, elevated primidone level at 19.2 and phenobarbital level of 31.  Patient was told to decrease her 50 mg primidone to twice daily instead of 3 times daily.     She can still have spells at most 5-6 and a month.  She continues to take carbamazepine 200 mg, 2 tabs  3 times a day and primidone 250 mg 3 times daily plus 50 mg twice daily.  Denies any side effects.     Interim Data:    With the patient today included:  [place]: work with [employer]; professional [address] the  was on speaker phone with us as well  History is obtained by Avinash Beach and the patient    She is  living in a residential facility      There have been no definitive seizures seizure or seizure-like events  She remains adherent to her medications  No evidence of malabsorption issues or side effects      New concerns as of office visit 12/16/2021  Having some falls: 6 falls in the past 4 months  No injuries only some minor scrapes  Patient states she sometimes feels dizzy but it is nothing to do with when she falls  Knows that she is walking around the next thing she knows she is on the floor  To the best that she can tell there is no loss of consciousness   states she was seen by orthopedic recently and this was felt not to be related to an orthopedic problem and was told to see neurology because they felt that it was more neurologic            Telephone on 09/20/2021   Component Date Value Ref Range Status    WBC 09/30/2021 5.1  3.4 - 10.8 x10E3/uL Final    RBC 09/30/2021 3.96  3.77 - 5.28 x10E6/uL Final    HGB 09/30/2021 12.6  11.1 - 15.9 g/dL Final    HCT 09/30/2021 38.2  34.0 - 46.6 % Final    MCV 09/30/2021 97  79 - 97 fL Final    MCH 09/30/2021 31.8  26.6 - 33.0 pg Final    MCHC 09/30/2021 33.0  31.5 - 35.7 g/dL Final    RDW 09/30/2021 12.5  11.7 - 15.4 % Final    PLATELET 41/47/7523 790  150 - 450 x10E3/uL Final    NEUTROPHILS 09/30/2021 56  Not Estab. % Final    Lymphocytes 09/30/2021 29  Not Estab. % Final    MONOCYTES 09/30/2021 10  Not Estab. % Final    EOSINOPHILS 09/30/2021 4  Not Estab. % Final    BASOPHILS 09/30/2021 1  Not Estab. % Final    ABS. NEUTROPHILS 09/30/2021 2.9  1.4 - 7.0 x10E3/uL Final    Abs Lymphocytes 09/30/2021 1.5  0.7 - 3.1 x10E3/uL Final    ABS. MONOCYTES 09/30/2021 0.5  0.1 - 0.9 x10E3/uL Final    ABS. EOSINOPHILS 09/30/2021 0.2  0.0 - 0.4 x10E3/uL Final    ABS. BASOPHILS 09/30/2021 0.0  0.0 - 0.2 x10E3/uL Final    IMMATURE GRANULOCYTES 09/30/2021 0  Not Estab. % Final    ABS. IMM. GRANS. 09/30/2021 0.0  0.0 - 0.1 x10E3/uL Final    Glucose 09/30/2021 73  65 - 99 mg/dL Final    BUN 09/30/2021 11  8 - 27 mg/dL Final    Creatinine 09/30/2021 0.80  0.57 - 1.00 mg/dL Final    GFR est non-AA 09/30/2021 80  >59 mL/min/1.73 Final    GFR est AA 09/30/2021 93  >59 mL/min/1.73 Final    Comment: **[facility] currently reports eGFR in compliance with the current**    recommendations of the [employer] Singh.  Nito Slider will    update reporting as new guidelines are published from the NKF-ASN    Task force.  BUN/Creatinine ratio 09/30/2021 14  12 - 28 Final    Sodium 09/30/2021 140  134 - 144 mmol/L Final    Potassium 09/30/2021 4.8  3.5 - 5.2 mmol/L Final    Chloride 09/30/2021 104  96 - 106 mmol/L Final    CO2 09/30/2021 26  20 - 29 mmol/L Final    Calcium 09/30/2021 8.6* 8.7 - 10.3 mg/dL Final    Protein, total 09/30/2021 7.3  6.0 - 8.5 g/dL Final    Albumin 09/30/2021 3.8  3.8 - 4.9 g/dL Final    GLOBULIN, TOTAL 09/30/2021 3.5  1.5 - 4.5 g/dL Final    A-G Ratio 09/30/2021 1.1* 1.2 - 2.2 Final    Bilirubin, total 09/30/2021 <0.2  0.0 - 1.2 mg/dL Final    Alk. phosphatase 09/30/2021 150* 44 - 121 IU/L Final                  **Please note reference interval change**    AST (SGOT) 09/30/2021 23  0 - 40 IU/L Final    ALT (SGPT) 09/30/2021 21  0 - 32 IU/L Final    Primidone, serum 09/30/2021 14.8* 5.0 - 12.0 ug/mL Final    Comment:                                 Detection Limit = 0.3                           <0.3 indicates None Detected  Patient drug level exceeds published reference range. Evaluate  clinically for signs of potential toxicity.  Phenobarbital 09/30/2021 36  15 - 40 ug/mL Final                                    Detection Limit = 3    Carbamazepine 09/30/2021 9.7  4.0 - 12.0 ug/mL Final    Comment: In conjunction with other antiepileptic drugs                                 Therapeutic  4.0 -  8.0                                 Toxicity     9.0 - 12.0                                     Carbamazepine alone                                 Therapeutic  8.0 - 12.0                                  Detection Limit =  2.0                            <2.0 indicated None Detected        No results found for this or any previous visit.       Allergies   Allergen Reactions    Doxycycline Nausea and Vomiting    Indomethacin Other (comments)     Loopy and out of her mind    Paroxetine Other (comments)     Head shakes      Sulfa (Sulfonamide Antibiotics) Other (comments)     Never taken       Current Outpatient Medications   Medication Sig    cetirizine (ZYRTEC) 10 mg tablet     diclofenac (VOLTAREN) 1 % gel Apply 2 g to affected area.  hydrOXYzine HCL (ATARAX) 25 mg tablet     loperamide (IMMODIUM) 2 mg tablet     omeprazole (PRILOSEC) 20 mg capsule     Emetrol soln solution     primidone (MYSOLINE) 250 mg tablet Take 1 Tablet by mouth three (3) times daily.  TEGretoL 200 mg tablet Take 2 Tablets by mouth three (3) times daily. Brand-name medically necessary  Indications: convulsive seizures    furosemide (LASIX) 20 mg tablet TAKE 1 2 TABLET BY MOUTH EVERY DAY AS NEEDED FOR SWELLING    famotidine (PEPCID) 20 mg tablet     ergocalciferol (ERGOCALCIFEROL) 1,250 mcg (50,000 unit) capsule TAKE 1 CAPSULE BY MOUTH ONE TIME PER WEEK    atorvastatin (LIPITOR) 20 mg tablet     primidone (MYSOLINE) 50 mg tablet TAKE 1 TAB BY MOUTH TWICE DAILY  Indications: convulsive seizures    Estrace 0.01 % (0.1 mg/gram) vaginal cream INSERT 1/2 GM VAGINALLY TWICE WEEKLY     No current facility-administered medications for this visit. Past medical history/surgical history, family history, and social history have been reviewed for today's visit      ROS    A ten system review of constitutional, cardiovascular, respiratory, musculoskeletal, endocrine, skin, SHEENT, genitourinary, psychiatric and neurologic systems was obtained and is unremarkable except as mentioned under HPI          EXAMINATION:     Visit Vitals  /79   Pulse 75   Temp 97.9 °F (36.6 °C) (Temporal)   Ht 5' 1\" (1.549 m)   Wt 168 lb 3.2 oz (76.3 kg)   SpO2 96%   BMI 31.78 kg/m²         General appearance: Patient is well-developed and well-nourished in no apparent distress and well groomed.     Psych/mental health:  Affect: Appropriate    PHQ  3 most recent PHQ Screens 12/16/2021   Little interest or pleasure in doing things Not at all   Feeling down, depressed, irritable, or hopeless Not at all   Total Score PHQ 2 0       HEENT:   Normocephalic  With evidence of trauma: No  Full range of motion head neck: Yes  Tenderness to palpation of the head neck region: No      Cardiovascular:     Extremities warm to touch: Yes  Extremity swelling: No  Discoloration: No  Evidence of PVD: No    Respiratory:   Dyspnea on exertion: No   Abnormal effort on casual observation: No   Use of portable oxygen: No   Evidence of cyanosis: No     Musculoskeletal:   Evidence of significant bone deformities: No   Spinal curvature: No     Integumentary:    Obvious bruising: No   Lacerations or discoloration on casual observation: No       Neurological Examination:   Mental Status:        MMSE  No flowsheet data found. Formal testing was not completed  Within the context of the patient's baseline cognitive basis:   there was nothing concerning on general observation and discussion. Speech was fluid. Could follow normal conversation with normal speed and processing. Was able to answer questions, voiced concerns, discuss current events without difficulty. Cranial Nerves:    Grossly intact    Motor:   Normal bulk  No tremor appreciated on today's exam  No abnormal movements appreciated on today's exam  Moves extremities spontaneously and with purpose  5/5 x 4      Sensation: Intact to light touch    Coordination/Cerebellar:   Grossly intact    Gait: Ambulates independently    Reflexes:  Symmetrical    Fall risk assessment  No flowsheet data found. Follow-up and Dispositions    · Return for Keep previously scheduled appointment.            Pablo Mcgovern MS, ANP-BC, [facility]

## 2021-12-16 NOTE — LETTER
12/16/2021    Patient: Abby Kingsley   YOB: 1960   Date of Visit: 12/16/2021     [address] [facility] [id number]  Via Fax: [phone]    Dear Enmanuel Baum MD,      Thank you for referring Ms. Abby Kinsgley to [address] [place] [address] for evaluation. My notes for this consultation are attached. If you have questions, please do not hesitate to call me. I look forward to following your patient along with you.       Sincerely,    Regis Hogue NP

## 2021-12-16 NOTE — PATIENT INSTRUCTIONS
As we talked about    From a seizure perspective things seem stable her concern is recent falls    So we will check some blood work working to check heart rate, check an EEG and an MRI scan  Central scheduling should be calling you to set up the test that have been ordered. If you do not hear from them you can reach out to them at [phone] to get that completed. At this point I would only maintain standard fall precautions I would not necessarily restrict any behaviors    I will make no changes in medications at this time    We will notify you regarding the results of the studies and if we need to do anything further after the studies have been completed    Otherwise we will see you back at your regular scheduled appointment in May 2022         Preventing Falls: Care Instructions  Your Care Instructions     Getting around your home safely can be a challenge if you have injuries or health problems that make it easy for you to fall. Loose rugs and furniture in walkways are among the dangers for many older people who have problems walking or who have poor eyesight. People who have conditions such as arthritis, osteoporosis, or dementia also have to be careful not to fall. You can make your home safer with a few simple measures. Follow-up care is a key part of your treatment and safety. Be sure to make and go to all appointments, and call your doctor if you are having problems. It's also a good idea to know your test results and keep a list of the medicines you take. How can you care for yourself at home? Taking care of yourself  · You may get dizzy if you do not drink enough water. To prevent dehydration, drink plenty of fluids. Choose water and other clear liquids. If you have kidney, heart, or liver disease and have to limit fluids, talk with your doctor before you increase the amount of fluids you drink. · Exercise regularly to improve your strength, muscle tone, and balance. Walk if you can.  Swimming may be a good choice if you cannot walk easily. · Have your vision and hearing checked each year or any time you notice a change. If you have trouble seeing and hearing, you might not be able to avoid objects and could lose your balance. · Know the side effects of the medicines you take. Ask your doctor or pharmacist whether the medicines you take can affect your balance. Sleeping pills or sedatives can affect your balance. · Limit the amount of alcohol you drink. Alcohol can impair your balance and other senses. · Ask your doctor whether calluses or corns on your feet need to be removed. If you wear loose-fitting shoes because of calluses or corns, you can lose your balance and fall. · Talk to your doctor if you have numbness in your feet. Preventing falls at home  · Remove raised doorway thresholds, throw rugs, and clutter. Repair loose carpet or raised areas in the floor. · Move furniture and electrical cords to keep them out of walking paths. · Use nonskid floor wax, and wipe up spills right away, especially on ceramic tile floors. · If you use a walker or cane, put rubber tips on it. If you use crutches, clean the bottoms of them regularly with an abrasive pad, such as steel wool. · Keep your house well lit, especially [place], and outside walkways. Use night-lights in areas such as hallways and bathrooms. Add extra light switches or use remote switches (such as switches that go on or off when you clap your hands) to make it easier to turn lights on if you have to get up during the night. · Install sturdy handrails on stairways. · Move items in your cabinets so that the things you use a lot are on the lower shelves (about waist level). · Keep a cordless phone and a flashlight with new batteries by your bed. If possible, put a phone in each of the main rooms of your house, or carry a cell phone in case you fall and cannot reach a phone.  Or, you can wear a device around your neck or wrist. You push a button that sends a signal for help. · Wear low-heeled shoes that fit well and give your feet good support. Use footwear with nonskid soles. Check the heels and soles of your shoes for wear. Repair or replace worn heels or soles. · Do not wear socks without shoes on wood floors. · Walk on the grass when the sidewalks are slippery. If you live in an area that gets snow and ice in the winter, sprinkle salt on slippery steps and sidewalks. Preventing falls in the bath  · Install grab bars and nonskid mats inside and outside your shower or tub and near the toilet and sinks. · Use shower chairs and bath benches. · Use a hand-held shower head that will allow you to sit while showering. · Get into a tub or shower by putting the weaker leg in first. Get out of a tub or shower with your strong side first.  · Repair loose toilet seats and consider installing a raised toilet seat to make getting on and off the toilet easier. · Keep your bathroom door unlocked while you are in the shower. Where can you learn more? Go to http://www.[employer].com/  Enter G117 in the search box to learn more about \"Preventing Falls: Care Instructions. \"  Current as of: December 7, 2020               Content Version: 13.0  © 8467-5409 [employer]. Care instructions adapted under license by [employer] (which disclaims liability or warranty for this information). If you have questions about a medical condition or this instruction, always ask your healthcare professional. [place] [address] any warranty or liability for your use of this information. How to Get Up Safely After a Fall: Care Instructions  Your Care Instructions     If you have injuries, health problems, or other reasons that may make it easy for you to fall at home, it is a good idea to learn how to get up safely after a fall.  Learning how to get up correctly can help you avoid making an injury worse.  Also, knowing what to do if you cannot get up can help you stay safe until help arrives. Follow-up care is a key part of your treatment and safety. Be sure to make and go to all appointments, and call your doctor if you are having problems. It's also a good idea to know your test results and keep a list of the medicines you take. How can you care for yourself after a fall? If you think you can get up  First lie still for a few minutes and think about how you feel. If your body feels okay and you think you can get up safely, follow the rest of the steps below:  1. Look for a chair or other piece of furniture that is close to you. 2. Roll onto your side and rest. Roll by turning your head in the direction you want to roll, move your shoulder and arm, then hip and leg in the same direction. 3. Lie still for a moment to let your blood pressure adjust.  4. Slowly push your upper body up, lift your head, and take a moment to rest.  5. Slowly get up on your hands and knees, and crawl to the chair or other stable piece of furniture. 6. Put your hands on the chair. 7. Move one foot forward, and place it flat on the floor. Your other leg should be bent with the knee on the floor. 8. Rise slowly, turn your body, and sit in the chair. Stay seated for a bit and think about how you feel. Call for help. Even if you feel okay, let someone know what happened to you. You might not know that you have a serious injury. If you cannot get up  1. If you think you are injured after a fall or you cannot get up, try not to panic. 2. Call out for help. 3. If you have a phone within reach or you have an emergency call device, use it to call for help. 4. If you do not have a phone within reach, try to slide yourself toward it. If you cannot get to the phone, try to slide toward a door or window or a place where you think you can be heard. 5. [place] or use an object to make noise so someone might hear you.   6. If you can reach something that you can use for a pillow, place it under your head. Try to stay warm by covering yourself with a blanket or clothing while you wait for help. When should you call for help? Call 911 anytime you think you may need emergency care. For example, call if:    · You passed out (lost consciousness).     · You cannot get up after a fall.     · You have severe pain. Call your doctor now or seek immediate medical care if:    · You have new or worse pain.     · You are dizzy or lightheaded.     · You hit your head. Watch closely for changes in your health, and be sure to contact your doctor if:    · You do not get better as expected. Where can you learn more? Go to http://www.stallings.com/  Enter G513 in the search box to learn more about \"How to Get Up Safely After a Fall: Care Instructions. \"  Current as of: December 7, 2020               Content Version: 13.0  © 2006-2021 [employer]. Care instructions adapted under license by [employer] (which disclaims liability or warranty for this information). If you have questions about a medical condition or this instruction, always ask your healthcare professional. [address] any warranty or liability for your use of this information.

## 2021-12-16 NOTE — ASSESSMENT & PLAN NOTE
New onset problem within the past 4 to 6 months  Unclear etiology  Doubtful there is seizures but always possible  Could also be cardiac based and unclear of whether patient has any new cerebrovascular insult  I did not perceive any evidence that would be consistent with myelopathy so I do not feel like we need to check cervical MRI at this time  Patient has had no serious injury    We will check blood work to include AED levels  We will check brain MRI  We will check Holter monitor  We will check EEG    I recommended routine safety precautions but no recent to significantly restrict activities

## 2021-12-20 ENCOUNTER — TELEPHONE (OUTPATIENT)
Dept: NEUROLOGY | Age: 61
End: 2021-12-20

## 2021-12-20 NOTE — TELEPHONE ENCOUNTER
----- Message from Jessica Olivo NP sent at 12/17/2021  8:42 AM EST -----  Please call the patient/ let them know that the results of her blood work show that she is anemic and this may be causing some of her dizziness and falls and they should follow-up with primary care regarding management of this but for right now I want her to take a multivitamin plus iron just a standard over-the-counter multivitamin plus iron for adults it can be a gummy it can be a chewable can be a solid tablet    Also her carbamazepine blood level was a little bit high however this is what we call a peak level and when I review her lab work over time this is not unusual for her peak level blood work but if her symptoms do not get better with the use of a multivitamin plus iron with follow-up from her primary care then we might consider reducing the carbamazepine    But she has been on this dose of carbamazepine for quite a number of years without any problems so I am thinking this is more probably due to the anemia

## 2021-12-20 NOTE — TELEPHONE ENCOUNTER
Called and spoke with Shon Chin. She is on patient HIPAA form. Verified name/. Notified of test results. She verbalized understanding. Stated to give the office a call with any questions or concerns.

## 2021-12-22 ENCOUNTER — TELEPHONE (OUTPATIENT)
Dept: NEUROLOGY | Age: 61
End: 2021-12-22

## 2021-12-22 DIAGNOSIS — E61.1 LOW SERUM IRON: Primary | ICD-10-CM

## 2021-12-22 NOTE — TELEPHONE ENCOUNTER
Ms. Gemini Smith states that the facility where the pt lives is requesting a rx for the multi Vit with Iron sent to [facility].  This is part of the facilities policy

## 2021-12-23 DIAGNOSIS — G40.309 NONINTRACTABLE GENERALIZED IDIOPATHIC EPILEPSY WITHOUT STATUS EPILEPTICUS (HCC): ICD-10-CM

## 2021-12-30 RX ORDER — MULTIVITAMIN WITH IRON
1 TABLET ORAL DAILY
Qty: 30 TABLET | Refills: 5 | Status: SHIPPED | OUTPATIENT
Start: 2021-12-30 | End: 2022-08-09

## 2021-12-30 RX ORDER — PRIMIDONE 50 MG/1
TABLET ORAL
Qty: 180 TABLET | Refills: 3 | Status: SHIPPED | OUTPATIENT
Start: 2021-12-30 | End: 2022-05-24 | Stop reason: DRUGHIGH

## 2022-01-11 ENCOUNTER — TELEPHONE (OUTPATIENT)
Dept: NEUROLOGY | Age: 62
End: 2022-01-11

## 2022-01-11 NOTE — TELEPHONE ENCOUNTER
Tegretol approved  Date range 1/1/22 - 1/11/23  Member ID [id number]    Dena Eric letter to [address] [place] [address]

## 2022-01-11 NOTE — TELEPHONE ENCOUNTER
Tegretol brand sent to [address] [place], [place] [address]vd: Martha Lindsey) - [id number]. Status pending. Your information has been submitted to [place] [employer]. [employer] will review the request and will issue a decision, typically within 1-3 days from your submission. You can check the updated outcome later by reopening this request.    Sent this michael to [facility] as an [id number].

## 2022-02-22 RX ORDER — PRIMIDONE 250 MG/1
TABLET ORAL
Qty: 93 TABLET | Refills: 5 | Status: SHIPPED | OUTPATIENT
Start: 2022-02-22

## 2022-03-18 PROBLEM — G40.009 PARTIAL IDIOPATHIC EPILEPSY WITH SEIZURES OF LOCALIZED ONSET, NOT INTRACTABLE, WITHOUT STATUS EPILEPTICUS (HCC): Status: ACTIVE | Noted: 2018-09-07

## 2022-03-19 PROBLEM — R29.6 FALLS FREQUENTLY: Status: ACTIVE | Noted: 2021-12-16

## 2022-03-19 PROBLEM — Q07.9 CONGENITAL ENCEPHALOPATHY (HCC): Status: ACTIVE | Noted: 2018-09-07

## 2022-03-19 PROBLEM — R42 DIZZY SPELLS: Status: ACTIVE | Noted: 2021-12-16

## 2022-05-10 ENCOUNTER — HOSPITAL ENCOUNTER (EMERGENCY)
Age: 62
Discharge: HOME | End: 2022-05-11
Attending: EMERGENCY MEDICINE
Payer: MEDICARE

## 2022-05-10 ENCOUNTER — APPOINTMENT (OUTPATIENT)
Dept: CT IMAGING | Age: 62
End: 2022-05-10
Attending: EMERGENCY MEDICINE
Payer: MEDICARE

## 2022-05-10 DIAGNOSIS — R31.9 HEMATURIA, UNSPECIFIED TYPE: ICD-10-CM

## 2022-05-10 DIAGNOSIS — R10.32 ABDOMINAL PAIN, LLQ (LEFT LOWER QUADRANT): ICD-10-CM

## 2022-05-10 DIAGNOSIS — N95.0 POST-MENOPAUSAL BLEEDING: Primary | ICD-10-CM

## 2022-05-10 LAB
ALBUMIN SERPL-MCNC: 3.2 G/DL (ref 3.5–5)
ALBUMIN/GLOB SERPL: 0.6 {RATIO} (ref 1.1–2.2)
ALP SERPL-CCNC: 166 U/L (ref 45–117)
ALT SERPL-CCNC: 26 U/L (ref 12–78)
ANION GAP SERPL CALC-SCNC: 5 MMOL/L (ref 5–15)
AST SERPL-CCNC: 25 U/L (ref 15–37)
BASOPHILS # BLD: 0 K/UL (ref 0–0.1)
BASOPHILS NFR BLD: 1 % (ref 0–1)
BILIRUB SERPL-MCNC: 0.1 MG/DL (ref 0.2–1)
BUN SERPL-MCNC: 18 MG/DL (ref 6–20)
BUN/CREAT SERPL: 22 (ref 12–20)
CALCIUM SERPL-MCNC: 8.5 MG/DL (ref 8.5–10.1)
CHLORIDE SERPL-SCNC: 109 MMOL/L (ref 97–108)
CO2 SERPL-SCNC: 26 MMOL/L (ref 21–32)
CREAT SERPL-MCNC: 0.82 MG/DL (ref 0.55–1.02)
DIFFERENTIAL METHOD BLD: ABNORMAL
EOSINOPHIL # BLD: 0.3 K/UL (ref 0–0.4)
EOSINOPHIL NFR BLD: 4 % (ref 0–7)
ERYTHROCYTE [DISTWIDTH] IN BLOOD BY AUTOMATED COUNT: 13.2 % (ref 11.5–14.5)
GLOBULIN SER CALC-MCNC: 5.1 G/DL (ref 2–4)
GLUCOSE SERPL-MCNC: 97 MG/DL (ref 65–100)
HCT VFR BLD AUTO: 36.8 % (ref 35–47)
HGB BLD-MCNC: 12 G/DL (ref 11.5–16)
IMM GRANULOCYTES # BLD AUTO: 0 K/UL (ref 0–0.04)
IMM GRANULOCYTES NFR BLD AUTO: 0 % (ref 0–0.5)
LYMPHOCYTES # BLD: 2.1 K/UL (ref 0.8–3.5)
LYMPHOCYTES NFR BLD: 29 % (ref 12–49)
MCH RBC QN AUTO: 32.2 PG (ref 26–34)
MCHC RBC AUTO-ENTMCNC: 32.6 G/DL (ref 30–36.5)
MCV RBC AUTO: 98.7 FL (ref 80–99)
MONOCYTES # BLD: 0.6 K/UL (ref 0–1)
MONOCYTES NFR BLD: 8 % (ref 5–13)
NEUTS SEG # BLD: 4.2 K/UL (ref 1.8–8)
NEUTS SEG NFR BLD: 58 % (ref 32–75)
NRBC # BLD: 0 K/UL (ref 0–0.01)
NRBC BLD-RTO: 0 PER 100 WBC
PLATELET # BLD AUTO: 190 K/UL (ref 150–400)
PMV BLD AUTO: 11.8 FL (ref 8.9–12.9)
POTASSIUM SERPL-SCNC: 3.9 MMOL/L (ref 3.5–5.1)
PROT SERPL-MCNC: 8.3 G/DL (ref 6.4–8.2)
RBC # BLD AUTO: 3.73 M/UL (ref 3.8–5.2)
SODIUM SERPL-SCNC: 140 MMOL/L (ref 136–145)
WBC # BLD AUTO: 7.2 K/UL (ref 3.6–11)

## 2022-05-10 PROCEDURE — 85025 COMPLETE CBC W/AUTO DIFF WBC: CPT

## 2022-05-10 PROCEDURE — 74011000636 HC RX REV CODE- 636: Performed by: EMERGENCY MEDICINE

## 2022-05-10 PROCEDURE — 74177 CT ABD & PELVIS W/CONTRAST: CPT

## 2022-05-10 PROCEDURE — 99285 EMERGENCY DEPT VISIT HI MDM: CPT

## 2022-05-10 PROCEDURE — 36415 COLL VENOUS BLD VENIPUNCTURE: CPT

## 2022-05-10 PROCEDURE — 80053 COMPREHEN METABOLIC PANEL: CPT

## 2022-05-10 PROCEDURE — 86900 BLOOD TYPING SEROLOGIC ABO: CPT

## 2022-05-10 RX ADMIN — IOPAMIDOL 100 ML: 755 INJECTION, SOLUTION INTRAVENOUS at 23:42

## 2022-05-11 VITALS
TEMPERATURE: 98.2 F | DIASTOLIC BLOOD PRESSURE: 76 MMHG | RESPIRATION RATE: 18 BRPM | SYSTOLIC BLOOD PRESSURE: 137 MMHG | OXYGEN SATURATION: 95 % | WEIGHT: 167.99 LBS | HEIGHT: 63 IN | BODY MASS INDEX: 29.77 KG/M2 | HEART RATE: 80 BPM

## 2022-05-11 LAB
ABO + RH BLD: NORMAL
APPEARANCE UR: CLEAR
BACTERIA URNS QL MICRO: NEGATIVE /HPF
BILIRUB UR QL: NEGATIVE
BLOOD GROUP ANTIBODIES SERPL: NORMAL
COLOR UR: ABNORMAL
EPITH CASTS URNS QL MICRO: ABNORMAL /LPF
GLUCOSE UR STRIP.AUTO-MCNC: NEGATIVE MG/DL
HGB UR QL STRIP: ABNORMAL
KETONES UR QL STRIP.AUTO: NEGATIVE MG/DL
LEUKOCYTE ESTERASE UR QL STRIP.AUTO: ABNORMAL
NITRITE UR QL STRIP.AUTO: NEGATIVE
PH UR STRIP: 7 [PH] (ref 5–8)
PROT UR STRIP-MCNC: NEGATIVE MG/DL
RBC #/AREA URNS HPF: ABNORMAL /HPF (ref 0–5)
SP GR UR REFRACTOMETRY: 1.03
SPECIMEN EXP DATE BLD: NORMAL
UA: UC IF INDICATED,UAUC: ABNORMAL
UROBILINOGEN UR QL STRIP.AUTO: 0.2 EU/DL (ref 0.2–1)
WBC URNS QL MICRO: ABNORMAL /HPF (ref 0–4)

## 2022-05-11 PROCEDURE — 81001 URINALYSIS AUTO W/SCOPE: CPT

## 2022-05-11 NOTE — ED PROVIDER NOTES
EMERGENCY DEPARTMENT HISTORY AND PHYSICAL EXAM      Date: 5/10/2022  Patient Name: Hawk Palafox  Patient Age and Sex: 65 y.o. female     History of Presenting Illness     Chief Complaint   Patient presents with    Vaginal Bleeding     Pt presents ambulatory into  from independent living facility with caretaker and family, pt has cognitive disabilities, caretaker reports that abdominal pain since yesterday and tonight had episode of vaginal bleeding, pt does not have menstrual cycle. History Provided By: Patient    HPI: Hawk Palafox is a 58-year-old history seizures, hyperlipidemia, vaginal atrophy on estradiol vaginal cream for some time presented with vaginal bleeding and abdominal pain. Patient reports for the past 2 days she has had left sided abdominal pain, present with walking cramping in nature. This morning she had an episode of scant blood in her panties attributed to vaginal bleeding. She had 2 normal bowel movements today once in the morning once in the evening with no blood noted. No history of recent constipation no nausea vomiting diarrhea, normal appetite no fever. There are no other complaints, changes, or physical findings at this time. PCP: CRISTINA Delacruz    No current facility-administered medications on file prior to encounter. Current Outpatient Medications on File Prior to Encounter   Medication Sig Dispense Refill    primidone (MYSOLINE) 250 mg tablet 1 TABLET BY MOUTH 3 TIMES A DAY 93 Tablet 5    primidone (MYSOLINE) 50 mg tablet 1 TABLET BY MOUTH 2 TIMES A  Tablet 3    multivitamin with iron tablet Take 1 Tablet by mouth daily. 30 Tablet 5    cetirizine (ZYRTEC) 10 mg tablet       diclofenac (VOLTAREN) 1 % gel Apply 2 g to affected area.       hydrOXYzine HCL (ATARAX) 25 mg tablet       loperamide (IMMODIUM) 2 mg tablet       omeprazole (PRILOSEC) 20 mg capsule       Emetrol soln solution       TEGretoL 200 mg tablet Take 2 Tablets by mouth three (3) times daily. Brand-name medically necessary  Indications: convulsive seizures 180 Tablet 12    furosemide (LASIX) 20 mg tablet TAKE 1 2 TABLET BY MOUTH EVERY DAY AS NEEDED FOR SWELLING      famotidine (PEPCID) 20 mg tablet       ergocalciferol (ERGOCALCIFEROL) 1,250 mcg (50,000 unit) capsule TAKE 1 CAPSULE BY MOUTH ONE TIME PER WEEK      atorvastatin (LIPITOR) 20 mg tablet       Estrace 0.01 % (0.1 mg/gram) vaginal cream INSERT 1/2 GM VAGINALLY TWICE WEEKLY         Past History     Past Medical History:  Past Medical History:   Diagnosis Date    Seizures ([address]        Past Surgical History:  Past Surgical History:   Procedure Laterality Date    HX TUBAL LIGATION         Family History:  Family History   Problem Relation Age of Onset    No Known Problems Mother     No Known Problems Father        Social History:  Social History     Tobacco Use    Smoking status: Never Smoker    Smokeless tobacco: Never Used   Substance Use Topics    Alcohol use: No    Drug use: Not on file       Allergies: Allergies   Allergen Reactions    Doxycycline Nausea and Vomiting    Indomethacin Other (comments)     Loopy and out of her mind    Paroxetine Other (comments)     Head shakes      Sulfa (Sulfonamide Antibiotics) Other (comments)     Never taken         Review of Systems   Review of Systems   Constitutional: Negative for chills and fever. HENT: Negative for congestion and rhinorrhea. Respiratory: Negative for shortness of breath. Cardiovascular: Negative for chest pain. Gastrointestinal: Positive for abdominal pain. Negative for nausea and vomiting. Genitourinary: Positive for vaginal bleeding. Negative for dysuria and frequency. Musculoskeletal: Negative for myalgias. All other systems reviewed and are negative. Physical Exam   Physical Exam  Vitals and nursing note reviewed. Constitutional:       General: She is not in acute distress. Appearance: Normal appearance.  She is not ill-appearing. HENT:      Head: Normocephalic. Mouth/Throat:      Mouth: Mucous membranes are moist.   Eyes:      Conjunctiva/sclera: Conjunctivae normal.   Cardiovascular:      Rate and Rhythm: Normal rate and regular rhythm. Pulses: Normal pulses. Pulmonary:      Effort: Pulmonary effort is normal.      Breath sounds: Normal breath sounds. Abdominal:      General: Abdomen is flat. Palpations: Abdomen is soft. Tenderness: There is abdominal tenderness (LLQ). Musculoskeletal:         General: No deformity. Skin:     General: Skin is warm and dry. Neurological:      Mental Status: She is alert and oriented to person, place, and time. Mental status is at baseline. Psychiatric:         Behavior: Behavior normal.         Thought Content: Thought content normal.          Diagnostic Study Results     Labs -     Recent Results (from the past 12 hour(s))   CBC WITH AUTOMATED DIFF    Collection Time: 05/10/22 10:11 PM   Result Value Ref Range    WBC 7.2 3.6 - 11.0 K/uL    RBC 3.73 (L) 3.80 - 5.20 M/uL    HGB 12.0 11.5 - 16.0 g/dL    HCT 36.8 35.0 - 47.0 %    MCV 98.7 80.0 - 99.0 FL    MCH 32.2 26.0 - 34.0 PG    MCHC 32.6 30.0 - 36.5 g/dL    RDW 13.2 11.5 - 14.5 %    PLATELET 727 620 - 092 K/uL    MPV 11.8 8.9 - 12.9 FL    NRBC 0.0 0  WBC    ABSOLUTE NRBC 0.00 0.00 - 0.01 K/uL    NEUTROPHILS 58 32 - 75 %    LYMPHOCYTES 29 12 - 49 %    MONOCYTES 8 5 - 13 %    EOSINOPHILS 4 0 - 7 %    BASOPHILS 1 0 - 1 %    IMMATURE GRANULOCYTES 0 0.0 - 0.5 %    ABS. NEUTROPHILS 4.2 1.8 - 8.0 K/UL    ABS. LYMPHOCYTES 2.1 0.8 - 3.5 K/UL    ABS. MONOCYTES 0.6 0.0 - 1.0 K/UL    ABS. EOSINOPHILS 0.3 0.0 - 0.4 K/UL    ABS. BASOPHILS 0.0 0.0 - 0.1 K/UL    ABS. IMM.  GRANS. 0.0 0.00 - 0.04 K/UL    DF AUTOMATED     METABOLIC PANEL, COMPREHENSIVE    Collection Time: 05/10/22 10:11 PM   Result Value Ref Range    Sodium 140 136 - 145 mmol/L    Potassium 3.9 3.5 - 5.1 mmol/L    Chloride 109 (H) 97 - 108 mmol/L    CO2 26 21 - 32 mmol/L    Anion gap 5 5 - 15 mmol/L    Glucose 97 65 - 100 mg/dL    BUN 18 6 - 20 MG/DL    Creatinine 0.82 0.55 - 1.02 MG/DL    BUN/Creatinine ratio 22 (H) 12 - 20      GFR est AA >60 >60 ml/min/1.73m2    GFR est non-AA >60 >60 ml/min/1.73m2    Calcium 8.5 8.5 - 10.1 MG/DL    Bilirubin, total 0.1 (L) 0.2 - 1.0 MG/DL    ALT (SGPT) 26 12 - 78 U/L    AST (SGOT) 25 15 - 37 U/L    Alk. phosphatase 166 (H) 45 - 117 U/L    Protein, total 8.3 (H) 6.4 - 8.2 g/dL    Albumin 3.2 (L) 3.5 - 5.0 g/dL    Globulin 5.1 (H) 2.0 - 4.0 g/dL    A-G Ratio 0.6 (L) 1.1 - 2.2     TYPE & SCREEN    Collection Time: 05/10/22 10:11 PM   Result Value Ref Range    Crossmatch Expiration 05/13/2022,2359     ABO/Rh(D) O POSITIVE     Antibody screen NEG    URINALYSIS W/ REFLEX CULTURE    Collection Time: 05/11/22  1:17 AM    Specimen: Urine   Result Value Ref Range    Color YELLOW/STRAW      Appearance CLEAR CLEAR      Specific gravity 1.028      pH (UA) 7.0 5.0 - 8.0      Protein Negative NEG mg/dL    Glucose Negative NEG mg/dL    Ketone Negative NEG mg/dL    Bilirubin Negative NEG      Blood MODERATE (A) NEG      Urobilinogen 0.2 0.2 - 1.0 EU/dL    Nitrites Negative NEG      Leukocyte Esterase SMALL (A) NEG      WBC 0-4 0 - 4 /hpf    RBC 20-50 0 - 5 /hpf    Epithelial cells FEW FEW /lpf    Bacteria Negative NEG /hpf    UA:UC IF INDICATED CULTURE NOT INDICATED BY UA RESULT CNI         Radiologic Studies -   CT ABD PELV W CONT   Final Result   No evidence of acute abdominal or pelvic process. Specifically, there is no   evidence of diverticulitis. CT Results  (Last 48 hours)               05/10/22 2342  CT ABD PELV W CONT Final result    Impression:  No evidence of acute abdominal or pelvic process. Specifically, there is no   evidence of diverticulitis. Narrative:  EXAM: CT ABD PELV W CONT       INDICATION: LLQ pain, eval diverticulitis        COMPARISON: 5/17/2018        CONTRAST: 100 mL of Isovue-370.        ORAL CONTRAST: None       TECHNIQUE:    Following the uneventful intravenous administration of contrast, thin axial   images were obtained through the abdomen and pelvis. Coronal and sagittal   reconstructions were generated. CT dose reduction was achieved through use of a   standardized protocol tailored for this examination and automatic exposure   control for dose modulation. FINDINGS:    LOWER THORAX: No significant abnormality in the incidentally imaged lower chest.   LIVER: No mass. BILIARY TREE: Gallbladder is within normal limits. CBD is not dilated. SPLEEN: within normal limits. PANCREAS: No mass or ductal dilatation. ADRENALS: Unremarkable. KIDNEYS: No mass, calculus, or hydronephrosis. STOMACH: Unremarkable. SMALL BOWEL: No dilatation or wall thickening. COLON: No dilatation or wall thickening. APPENDIX: Normal.   PERITONEUM: No ascites or pneumoperitoneum. RETROPERITONEUM: No lymphadenopathy or aortic aneurysm. REPRODUCTIVE ORGANS: Unremarkable. URINARY BLADDER: No mass or calculus. BONES: No destructive bone lesion. ABDOMINAL WALL: No mass or hernia. ADDITIONAL COMMENTS: N/A               CXR Results  (Last 48 hours)    None            Medical Decision Making   I am the first provider for this patient. I reviewed the vital signs, available nursing notes, past medical history, past surgical history, family history and social history. Vital Signs-Reviewed the patient's vital signs. Patient Vitals for the past 12 hrs:   Temp Pulse Resp BP SpO2   05/11/22 0127 -- 80 -- 137/76 95 %   05/10/22 2203 98.2 °F (36.8 °C) 84 18 (!) 157/82 100 %       Records Reviewed: Nursing Notes and Old Medical Records    Provider Notes (Medical Decision Making):   Differential diagnosis includes uterine cancer, consider diverticulitis in setting of left lower quadrant tenderness.     Will obtain CT abdomen, CBC, CMP, transvaginal ultrasound to evaluate for the above    ED Course:   Initial assessment performed. The patients presenting problems have been discussed, and they are in agreement with the care plan formulated and outlined with them. I have encouraged them to ask questions as they arise throughout their visit. ED Course as of 05/11/22 0152   Tue May 10, 2022   2353 CMP normal electrolytes and renal function normal LFTs, CBC normal [WB]   Wed May 11, 2022   0017 CT abdomen demonstrates no acute abnormality, no diverticulitis [WB]   0017 Hemoglobin 12 hemodynamically stable [WB]   0119 Family would like to perform ultrasound as outpatient [WB]   0149 UA without evidence of infection, moderate blood [WB]      ED Course User Index  [WB] Ama Tillman MD     Disposition:  Discharge Note:  The patient has been re-evaluated and is ready for discharge. Reviewed available results with patient. Counseled patient on diagnosis and care plan. Patient has expressed understanding, and all questions have been answered. Patient agrees with plan and agrees to follow up as recommended, or to return to the ED if their symptoms worsen. Discharge instructions have been provided and explained to the patient, along with reasons to return to the ED. PLAN:  Current Discharge Medication List        2. Follow-up Information     Follow up With Specialties Details Why [address] [facility] an appointment as soon as possible for a visit  For evaluation of post menopausal bleeding [phone] [facility] EMERGENCY DEPT Emergency Medicine  As needed, If symptoms worsen [address]  [address] [facility]  [phone]    Claire Camacho, [address] Physician Assistant  Please follow-up with PCP for repeat urinalysis in 1 week to ensure resolved hematuria, consider urology consultation if not resolved [facility] [address] [phone]          3. Return to ED if worse     Diagnosis     Clinical Impression:   1. Post-menopausal bleeding    2. Abdominal pain, LLQ (left lower quadrant)    3. Hematuria, unspecified type        Attestations:    Get Red M.D. Please note that this dictation was completed with [employer], the computer voice recognition software. Quite often unanticipated grammatical, syntax, homophones, and other interpretive errors are inadvertently transcribed by the computer software. Please disregard these errors. Please excuse any errors that have escaped final proofreading. Thank you.

## 2022-05-11 NOTE — ED NOTES
Pt discharged to home, in nad, her family member will be taking her back to the facility, iv dc'd from left ac 20g int, dressing applied, no bleeding noted; denies pain at this time; pt ambulatory to the er lobby with her family members.  Family states understanding of dc instructions, follow up

## 2022-05-24 ENCOUNTER — OFFICE VISIT (OUTPATIENT)
Dept: NEUROLOGY | Age: 62
End: 2022-05-24
Payer: MEDICARE

## 2022-05-24 ENCOUNTER — TELEPHONE (OUTPATIENT)
Dept: NEUROLOGY | Age: 62
End: 2022-05-24

## 2022-05-24 VITALS
SYSTOLIC BLOOD PRESSURE: 110 MMHG | BODY MASS INDEX: 29.73 KG/M2 | HEART RATE: 73 BPM | HEIGHT: 63 IN | WEIGHT: 167.8 LBS | RESPIRATION RATE: 15 BRPM | TEMPERATURE: 97.3 F | DIASTOLIC BLOOD PRESSURE: 84 MMHG | OXYGEN SATURATION: 99 %

## 2022-05-24 DIAGNOSIS — Q07.9 CONGENITAL ENCEPHALOPATHY (HCC): ICD-10-CM

## 2022-05-24 DIAGNOSIS — R42 DIZZY SPELLS: ICD-10-CM

## 2022-05-24 DIAGNOSIS — G93.49 ENCEPHALOPATHY CHRONIC: Primary | ICD-10-CM

## 2022-05-24 DIAGNOSIS — G40.009 PARTIAL IDIOPATHIC EPILEPSY WITH SEIZURES OF LOCALIZED ONSET, NOT INTRACTABLE, WITHOUT STATUS EPILEPTICUS (HCC): ICD-10-CM

## 2022-05-24 DIAGNOSIS — R29.6 FALLS FREQUENTLY: ICD-10-CM

## 2022-05-24 PROCEDURE — G8417 CALC BMI ABV UP PARAM F/U: HCPCS | Performed by: PSYCHIATRY & NEUROLOGY

## 2022-05-24 PROCEDURE — G8432 DEP SCR NOT DOC, RNG: HCPCS | Performed by: PSYCHIATRY & NEUROLOGY

## 2022-05-24 PROCEDURE — G8427 DOCREV CUR MEDS BY ELIG CLIN: HCPCS | Performed by: PSYCHIATRY & NEUROLOGY

## 2022-05-24 PROCEDURE — 3017F COLORECTAL CA SCREEN DOC REV: CPT | Performed by: PSYCHIATRY & NEUROLOGY

## 2022-05-24 PROCEDURE — 99215 OFFICE O/P EST HI 40 MIN: CPT | Performed by: PSYCHIATRY & NEUROLOGY

## 2022-05-24 RX ORDER — PANTOPRAZOLE SODIUM 40 MG/1
40 TABLET, DELAYED RELEASE ORAL DAILY
COMMUNITY
Start: 2022-05-02

## 2022-05-24 RX ORDER — PRIMIDONE 50 MG/1
50 TABLET ORAL DAILY
Qty: 90 TABLET | Refills: 2 | Status: SHIPPED | OUTPATIENT
Start: 2022-05-24 | End: 2022-08-09 | Stop reason: DRUGHIGH

## 2022-05-24 RX ORDER — ACETAMINOPHEN 325 MG/1
TABLET ORAL
COMMUNITY

## 2022-05-24 NOTE — ASSESSMENT & PLAN NOTE
Unclear etiology  Seems to be going on for at least 12 months  Doubtful these are seizures but always possible  Could also be cardiac based but unclear  Unclear if there is a cerebrovascular insult  Exam is not consistent with myelopathy so I do not feel as though we need to look at the neck presently  Patient has had no serious injuries    Will recheck AED levels today   Reducing Mysoline by 50 mg per 24 hours [she will still continue 250 mg 3 times a day and 50 mg once a day~previously the 50 mg was twice a day]    I will reorder MRI of the brain Holter monitor and EEG    Continue regular safety precautions and seizure precautions

## 2022-05-24 NOTE — LETTER
5/24/2022    Patient: Maycol Barfield   YOB: 1960   Date of Visit: 5/24/2022     Darien Castanon, [address] [place] [address] Street [address]  Via Fax: [phone]    Dear Darien Castanon, PA,      Thank you for referring Ms. Maycol Barfield to [address] [place] [address] for evaluation. My notes for this consultation are attached. If you have questions, please do not hesitate to call me. I look forward to following your patient along with you.       Sincerely,    Marcello Mercado NP

## 2022-05-24 NOTE — PATIENT INSTRUCTIONS
As per discussion    The falls may be related to medication effect so were going to decrease the Mysoline from 50 mg twice a day to 50 mg once a day    Continue on Mysoline 250 mg 3 times a day    And continue on the Tegretol 200 mg 2 tablets 3 times a day for now    We are going to check blood work    We will check MRI EEG and Holter monitor  Central scheduling should be calling you to set up the test that have been ordered. If you do not hear from them you can reach out to them at [phone] to get that completed. I would recommend activating [facility] so that we can communicate through [address] on the patient's behalf regarding results of studies and any other information or procedures that we may need to order      Office Policies      o Appointments  Please make sure that you arrive for your next appointment at least 15 minutes prior to your appointment time. If for some reason you are going to be late please notify the office to determine if you need to be rescheduled or we can adjust your appointment time      o Phone calls/patient messages:  Please allow up to 24 hours for someone in the office to contact you about your call or message. Be mindful your provider may be out of the office or your message may require further review. We encourage you to use [facility] for your messages as this is a faster, more efficient way to communicate with our office    o Medication Refills:  Prescription medications require up to 48 business hours to process. We encourage you to use [facility] for your refills. For controlled medications: Please allow up to 72 business hours to process. Certain medications may require you to  a written prescription at our office. NO narcotic/controlled medications will be prescribed after 4pm Monday through Friday or on weekends    o Form/Paperwork Completion:  We ask that you allow 7-14 business days.  You may also download your forms to [facility] to have your doctor print off.

## 2022-05-24 NOTE — ASSESSMENT & PLAN NOTE
Stable without recurrence  Continue on Tegretol brand-name only 200 mg 2 tablets 3 times a day  Continue on Mysoline 250 mg 3 times a day  And reducing Mysoline 50 mg from twice a day to daily    Repeat AED levels today.   This will be prior to the reduction in the Mysoline    My goal is to get all of her AEDs back into therapeutic range appears to be toxic could be contributing

## 2022-05-24 NOTE — PROGRESS NOTES
[facility] 84  In Office FOLLOW-UP VISIT         Josie Georges is a 65 y.o. female who presents today for the following:  Chief Complaint   Patient presents with    Follow-up     yearly follow up states that she is having some stomach issure but here for the yearly medication followup         ASSESSMENT AND PLAN  1. Encephalopathy chronic  -     MRI BRAIN W WO CONT; Future  -     ECG HOLTER MONITOR, ANALYSIS 48 HR; Future  -     EEG; Future  2. Partial idiopathic epilepsy with seizures of localized onset, not intractable, without status epilepticus ([facility] [address]  Assessment & Plan:  Stable without recurrence  Continue on Tegretol brand-name only 200 mg 2 tablets 3 times a day  Continue on Mysoline 250 mg 3 times a day  And reducing Mysoline 50 mg from twice a day to daily    Repeat AED levels today. This will be prior to the reduction in the Mysoline    My goal is to get all of her AEDs back into therapeutic range appears to be toxic could be contributing  Orders:  -     CARBAMAZEPINE; Future  -     PRIMIDONE (MYSOLINE); Future  -     PHENOBARBITAL LEVEL; Future  -     primidone (MYSOLINE) 50 mg tablet; Take 1 Tablet by mouth daily. Indications: convulsive seizures, Normal, Disp-90 Tablet, R-2  3. Congenital encephalopathy ([facility] [address]  Assessment & Plan:  Stable and unchanged patient is able to make needs known as well as appropriate concerns  Orders:  -     EEG; Future  4.  Falls frequently  Assessment & Plan:   Unclear etiology  Seems to be going on for at least 12 months  Doubtful these are seizures but always possible  Could also be cardiac based but unclear  Unclear if there is a cerebrovascular insult  Exam is not consistent with myelopathy so I do not feel as though we need to look at the neck presently  Patient has had no serious injuries    Will recheck AED levels today   Reducing Mysoline by 50 mg per 24 hours [she will still continue 250 mg 3 times a day and 50 mg once a day~previously the 50 mg was twice a day]    I will reorder MRI of the brain Holter monitor and EEG    Continue regular safety precautions and seizure precautions    Orders:  -     ECG HOLTER MONITOR, ANALYSIS 48 HR; Future  5. Dizzy spells  Assessment & Plan:   As best we can tell is not related to falls I will reorder the Holter monitor  Orders:  -     ECG HOLTER MONITOR, ANALYSIS 48 HR; Future    Patient and/or family was given time to ask questions and voice concerns. I believe all questions concerns were adequately addressed at this  office visit. Patient and/or family also verbalized agreement and understanding of the above-stated plan    Patient remains a complex patient secondary to polypharmacy, significant comorbid conditions, and use of high-risk medications which complicate the decision making process related to patient's neurologic diagnosis        ICD-10-CM ICD-9-CM    1. Encephalopathy chronic  G93.49 348.39 MRI BRAIN W WO CONT      ECG HOLTER MONITOR, ANALYSIS 48 HR      EEG   2. Partial idiopathic epilepsy with seizures of localized onset, not intractable, without status epilepticus (HCC)  G40.009 345.50 CARBAMAZEPINE      PRIMIDONE (MYSOLINE)      PHENOBARBITAL LEVEL      primidone (MYSOLINE) 50 mg tablet   3. Congenital encephalopathy (HCC)  Q07.9 742.9 EEG   4. Falls frequently  R29.6 V15.88 ECG HOLTER MONITOR, ANALYSIS 48 HR   5. Dizzy spells  R42 780.4 ECG HOLTER MONITOR, ANALYSIS 48 HR         I attest that 40 minutes was spent on today's visit reviewing medical records and diagnostic testing deemed pertinent to this patient's care, along with direct time spent at patient's visit including the history, physical assessment and plan, discussing diagnosis and management along with documentation.       HPI  Historical Data  Historical Data  Patient is known to the practice  Ml Du a female with history of epilepsy and congenital encephalopathy     Patient was initially seen by  Regino Gonzalez (neurology) 3/2/2018 for epilepsy.  Note mentions patient having 5-6 seizures per month.  History of congenital static encephalopathy. Jaylan Chauhan with her mother. Jayme Orozco being followed by a physician in [facility] seizures since age 7 months.       Patient is on primidone 250 mg TID and Tegretol 400 mg twice daily. Tegretol is brand-name only as she has significant increase in seizures on generic to almost daily; prescription states TID   Primidone level was 5.4 and phenobarbital derivative was 9.  Carbamazepine level was 8.6.    Primidone 50 mg twice daily was added to her regimen.       Patient was seen on follow-up 9/7/2018 and note mentions patient doing well since primidone was increased.         Patient still having breakthrough seizures several times a month which composed of contortion of the upper body with arms rising and head turning and making noises.  No loss of consciousness and it does not generalized.      Rubs hands together or stand still w starring lasts several mins and is postictal for a bit \"makes her tired\" and she knows everything going on around her      Plan was to continue her current regiment of 300 mg of primidone 3 times a day and carbamazepine 400 mg twice daily.    Primidone level was slightly elevated at 12.2 and phenobarbital level was 30.       Patient was then seen by Dr. Regino Gonzalez 5/3/2019 and note mentions patient having no breakthrough seizures since the last visit.  Exam noted some unsteadiness to her gait and nystagmus on lateral gaze.  Labs done revealed elevated carbamazepine level at 13, elevated primidone level at 19.2 and phenobarbital level of 31.  Patient was told to decrease her 50 mg primidone to twice daily instead of 3 times daily.     She can still have spells at most 5-6 and a month.  She continues to take carbamazepine 200 mg, 2 tabs  3 times a day and primidone 250 mg 3 times daily plus 50 mg twice daily.  Denies any side effects.     Interim Data:      With the patient today included:  Mykel Lynn: , work with [employer]; professional support   She to started in April    Patient was last seen 12/22/2021 for frequent falls         She is  living in a residential facility    There have been no definitive seizures seizure or seizure-like events  She remains adherent to her medications  No evidence of malabsorption issues or side effects      New concerns as of office visit 12/16/2021  Having some falls: 6 falls in the past 4 months  No injuries only some minor scrapes  Patient states she sometimes feels dizzy but it is nothing to do with when she falls  Knows that she is walking around the next thing she knows she is on the floor  To the best that she can tell there is no loss of consciousness   states she was seen by orthopedic recently and this was felt not to be related to an orthopedic problem and was told to see neurology because they felt that it was more neurologic  OV 5/24/22: continues with multiple falls without LOC or significant injury   Testing that was ordered back in December 2021 was really never completed with the exception of the blood work which does show elevated AED levels   Patient denies evidence of dizziness lightheadedness paresthesias and balance she just tends to fall   Manager did not witness one of them when she was going up the stairs and the patient tripped   Another 1 occurred while the manager was on duty but did not witness it and patient had a very small cut on her lip from this                Admission on 05/10/2022, Discharged on 05/11/2022   Component Date Value Ref Range Status    WBC 05/10/2022 7.2  3.6 - 11.0 K/uL Final    RBC 05/10/2022 3.73* 3.80 - 5.20 M/uL Final    HGB 05/10/2022 12.0  11.5 - 16.0 g/dL Final    HCT 05/10/2022 36.8  35.0 - 47.0 % Final    MCV 05/10/2022 98.7  80.0 - 99.0 FL Final    MCH 05/10/2022 32.2  26.0 - 34.0 PG Final    MCHC 05/10/2022 32.6  30.0 - 36.5 g/dL Final    RDW 05/10/2022 13.2  11.5 - 14.5 % Final    PLATELET 66/15/7595 590  150 - 400 K/uL Final    MPV 05/10/2022 11.8  8.9 - 12.9 FL Final    NRBC 05/10/2022 0.0  0  WBC Final    ABSOLUTE NRBC 05/10/2022 0.00  0.00 - 0.01 K/uL Final    NEUTROPHILS 05/10/2022 58  32 - 75 % Final    LYMPHOCYTES 05/10/2022 29  12 - 49 % Final    MONOCYTES 05/10/2022 8  5 - 13 % Final    EOSINOPHILS 05/10/2022 4  0 - 7 % Final    BASOPHILS 05/10/2022 1  0 - 1 % Final    IMMATURE GRANULOCYTES 05/10/2022 0  0.0 - 0.5 % Final    ABS. NEUTROPHILS 05/10/2022 4.2  1.8 - 8.0 K/UL Final    ABS. LYMPHOCYTES 05/10/2022 2.1  0.8 - 3.5 K/UL Final    ABS. MONOCYTES 05/10/2022 0.6  0.0 - 1.0 K/UL Final    ABS. EOSINOPHILS 05/10/2022 0.3  0.0 - 0.4 K/UL Final    ABS. BASOPHILS 05/10/2022 0.0  0.0 - 0.1 K/UL Final    ABS. IMM. GRANS. 05/10/2022 0.0  0.00 - 0.04 K/UL Final    DF 05/10/2022 AUTOMATED    Final    Sodium 05/10/2022 140  136 - 145 mmol/L Final    Potassium 05/10/2022 3.9  3.5 - 5.1 mmol/L Final    Chloride 05/10/2022 109* 97 - 108 mmol/L Final    CO2 05/10/2022 26  21 - 32 mmol/L Final    Anion gap 05/10/2022 5  5 - 15 mmol/L Final    Glucose 05/10/2022 97  65 - 100 mg/dL Final    BUN 05/10/2022 18  6 - 20 MG/DL Final    Creatinine 05/10/2022 0.82  0.55 - 1.02 MG/DL Final    BUN/Creatinine ratio 05/10/2022 22* 12 - 20   Final    GFR est AA 05/10/2022 >60  >60 ml/min/1.73m2 Final    GFR est non-AA 05/10/2022 >60  >60 ml/min/1.73m2 Final    Estimated GFR is calculated using the IDMS-traceable Modification of Diet in Renal Disease (MDRD) Study equation, reported for both  Americans (GFRAA) and non- Americans (GFRNA), and normalized to 1.73m2 body surface area. The physician must decide which value applies to the patient.     Calcium 05/10/2022 8.5  8.5 - 10.1 MG/DL Final    Bilirubin, total 05/10/2022 0.1* 0.2 - 1.0 MG/DL Final    ALT (SGPT) 05/10/2022 26  12 - 78 U/L Final    AST (SGOT) 05/10/2022 25  15 - 37 U/L Final    Alk. phosphatase 05/10/2022 166* 45 - 117 U/L Final    Protein, total 05/10/2022 8.3* 6.4 - 8.2 g/dL Final    Albumin 05/10/2022 3.2* 3.5 - 5.0 g/dL Final    Globulin 05/10/2022 5.1* 2.0 - 4.0 g/dL Final    A-G Ratio 05/10/2022 0.6* 1.1 - 2.2   Final    Crossmatch Expiration 05/10/2022 05/13/2022,2359   Final    ABO/Rh(D) 05/10/2022 O POSITIVE   Final    Antibody screen 05/10/2022 NEG   Final    Color 05/11/2022 YELLOW/STRAW    Final    Color Reference Range: Straw, Yellow or Dark Yellow    Appearance 05/11/2022 CLEAR  CLEAR   Final    Specific gravity 05/11/2022 1.028    Final    pH (UA) 05/11/2022 7.0  5.0 - 8.0   Final    Protein 05/11/2022 Negative  NEG mg/dL Final    Glucose 05/11/2022 Negative  NEG mg/dL Final    Ketone 05/11/2022 Negative  NEG mg/dL Final    Bilirubin 05/11/2022 Negative  NEG   Final    Blood 05/11/2022 MODERATE* NEG   Final    Urobilinogen 05/11/2022 0.2  0.2 - 1.0 EU/dL Final    Nitrites 05/11/2022 Negative  NEG   Final    Leukocyte Esterase 05/11/2022 SMALL* NEG   Final    WBC 05/11/2022 0-4  0 - 4 /hpf Final    RBC 05/11/2022 20-50  0 - 5 /hpf Final    Epithelial cells 05/11/2022 FEW  FEW /lpf Final    Epithelial cell category consists of squamous cells and /or transitional urothelial cells. Renal tubular cells, if present, are separately identified as such.     Bacteria 05/11/2022 Negative  NEG /hpf Final    UA:UC IF INDICATED 05/11/2022 CULTURE NOT INDICATED BY UA RESULT  CNI   Final   Orders Only on 12/16/2021   Component Date Value Ref Range Status    Sodium 12/16/2021 140  136 - 145 mmol/L Final    Potassium 12/16/2021 4.2  3.5 - 5.1 mmol/L Final    Chloride 12/16/2021 109* 97 - 108 mmol/L Final    CO2 12/16/2021 26  21 - 32 mmol/L Final    Anion gap 12/16/2021 5  5 - 15 mmol/L Final    Glucose 12/16/2021 86  65 - 100 mg/dL Final    BUN 12/16/2021 12  6 - 20 MG/DL Final    Creatinine 12/16/2021 0.82  0.55 - 1.02 MG/DL Final    BUN/Creatinine ratio 12/16/2021 15  12 - 20   Final    GFR est AA 12/16/2021 >60  >60 ml/min/1.73m2 Final    GFR est non-AA 12/16/2021 >60  >60 ml/min/1.73m2 Final    Comment: Estimated GFR is calculated using the IDMS-traceable Modification of Diet in  Renal Disease (MDRD) Study equation, reported for both  Americans  (GFRAA) and non- Americans (GFRNA), and normalized to 1.73m2 body  surface area. The physician must decide which value applies to the patient.  Calcium 12/16/2021 8.6  8.5 - 10.1 MG/DL Final    Bilirubin, total 12/16/2021 0.2  0.2 - 1.0 MG/DL Final    ALT (SGPT) 12/16/2021 22  12 - 78 U/L Final    AST (SGOT) 12/16/2021 22  15 - 37 U/L Final    Alk. phosphatase 12/16/2021 159* 45 - 117 U/L Final    Protein, total 12/16/2021 7.9  6.4 - 8.2 g/dL Final    Albumin 12/16/2021 3.4* 3.5 - 5.0 g/dL Final    Globulin 12/16/2021 4.5* 2.0 - 4.0 g/dL Final    A-G Ratio 12/16/2021 0.8* 1.1 - 2.2   Final    WBC 12/16/2021 5.4  3.6 - 11.0 K/uL Final    RBC 12/16/2021 3.13* 3.80 - 5.20 M/uL Final    HGB 12/16/2021 12.3  11.5 - 16.0 g/dL Final    HCT 12/16/2021 32.8* 35.0 - 47.0 % Final    MCV 12/16/2021 104.8* 80.0 - 99.0 FL Final    MCH 12/16/2021 39.3* 26.0 - 34.0 PG Final    MCHC 12/16/2021 37.5* 30.0 - 36.5 g/dL Final    RDW 12/16/2021 13.2  11.5 - 14.5 % Final    PLATELET 39/13/8667 048  150 - 400 K/uL Final    MPV 12/16/2021 11.9  8.9 - 12.9 FL Final    NRBC 12/16/2021 0.4* 0  WBC Final    ABSOLUTE NRBC 12/16/2021 0.02* 0.00 - 0.01 K/uL Final    NEUTROPHILS 12/16/2021 54  32 - 75 % Final    LYMPHOCYTES 12/16/2021 31  12 - 49 % Final    MONOCYTES 12/16/2021 8  5 - 13 % Final    EOSINOPHILS 12/16/2021 6  0 - 7 % Final    BASOPHILS 12/16/2021 1  0 - 1 % Final    IMMATURE GRANULOCYTES 12/16/2021 0  0.0 - 0.5 % Final    ABS. NEUTROPHILS 12/16/2021 2.9  1.8 - 8.0 K/UL Final    ABS.  LYMPHOCYTES 12/16/2021 1.7  0.8 - 3.5 K/UL Final    ABS. MONOCYTES 2021 0.4  0.0 - 1.0 K/UL Final    ABS. EOSINOPHILS 2021 0.3  0.0 - 0.4 K/UL Final    ABS. BASOPHILS 2021 0.0  0.0 - 0.1 K/UL Final    ABS. IMM. GRANS. 2021 0.0  0.00 - 0.04 K/UL Final    DF 2021 AUTOMATED    Final    Primidone, serum 2021 18.3* 5.0 - 12.0 ug/mL Final    Comment: (NOTE)                                 Detection Limit = 0.3                          <0.3 indicates None Detected      Phenobarbital 2021 42* 15 - 40 ug/mL Final    Comment: (NOTE)                                 Detection Limit = 3  Patient drug level exceeds published reference range. Evaluate  clinically for signs of potential toxicity. Performed At: [facility] & [address] [id number]  Cloria Rinne MD E      Carbamazepine 2021 16.8* 4.0 - 12.0 ug/mL Final      No results found for this or any previous visit. Allergies   Allergen Reactions    Doxycycline Nausea and Vomiting    Indomethacin Other (comments)     Loopy and out of her mind    Paroxetine Other (comments)     Head shakes      Sulfa (Sulfonamide Antibiotics) Other (comments)     Never taken       Current Outpatient Medications   Medication Sig    pantoprazole (PROTONIX) 40 mg tablet     acetaminophen (TylenoL) 325 mg tablet Take  by mouth every four (4) hours as needed for Pain.  promethazine/dextromethorphan (PROMETHAZINE-DM PO) Take  by mouth.  primidone (MYSOLINE) 50 mg tablet Take 1 Tablet by mouth daily. Indications: convulsive seizures    primidone (MYSOLINE) 250 mg tablet 1 TABLET BY MOUTH 3 TIMES A DAY    multivitamin with iron tablet Take 1 Tablet by mouth daily.  cetirizine (ZYRTEC) 10 mg tablet     diclofenac (VOLTAREN) 1 % gel Apply 2 g to affected area.     hydrOXYzine HCL (ATARAX) 25 mg tablet     loperamide (IMMODIUM) 2 mg tablet     Emetrol soln solution     TEGretoL 200 mg tablet Take 2 Tablets by mouth three (3) times daily. Brand-name medically necessary  Indications: convulsive seizures    furosemide (LASIX) 20 mg tablet TAKE 1 2 TABLET BY MOUTH EVERY DAY AS NEEDED FOR SWELLING    ergocalciferol (ERGOCALCIFEROL) 1,250 mcg (50,000 unit) capsule TAKE 1 CAPSULE BY MOUTH ONE TIME PER WEEK    atorvastatin (LIPITOR) 20 mg tablet     Estrace 0.01 % (0.1 mg/gram) vaginal cream INSERT 1/2 GM VAGINALLY TWICE WEEKLY    omeprazole (PRILOSEC) 20 mg capsule  (Patient not taking: Reported on 5/24/2022)    famotidine (PEPCID) 20 mg tablet  (Patient not taking: Reported on 5/24/2022)     No current facility-administered medications for this visit. Past medical history/surgical history, family history, and social history have been reviewed for today's visit      ROS    A ten system review of constitutional, cardiovascular, respiratory, musculoskeletal, endocrine, skin, SHEENT, genitourinary, psychiatric and neurologic systems was obtained and is unremarkable except as mentioned under HPI          EXAMINATION:     Visit Vitals  /84 (BP 1 Location: Left upper arm, BP Patient Position: Sitting, BP Cuff Size: Adult)   Pulse 73   Temp 97.3 °F (36.3 °C) (Temporal)   Resp 15   Ht 5' 3\" (1.6 m)   Wt 167 lb 12.8 oz (76.1 kg)   SpO2 99%   BMI 29.72 kg/m²         General appearance: Patient is well-developed and well-nourished in no apparent distress and well groomed.     Psych/mental health:  Affect: Appropriate    PHQ  3 most recent PHQ Screens 5/24/2022   Little interest or pleasure in doing things Not at all   Feeling down, depressed, irritable, or hopeless Not at all   Total Score PHQ 2 0       HEENT:   Normocephalic  With evidence of trauma: No  Full range of motion head neck: Yes  Tenderness to palpation of the head neck region: No      Cardiovascular:     Extremities warm to touch: Yes  Extremity swelling: No  Discoloration: No  Evidence of PVD: No    Respiratory:   Dyspnea on exertion: No   Abnormal effort on casual observation: No   Use of portable oxygen: No   Evidence of cyanosis: No     Musculoskeletal:   Evidence of significant bone deformities: No   Spinal curvature: No     Integumentary:    Obvious bruising: No   Lacerations or discoloration on casual observation: No       Neurological Examination:   Mental Status:        MMSE  No flowsheet data found. Formal testing was not completed  Within the context of the patient's baseline cognitive basis:   there was nothing concerning on general observation and discussion. Speech was fluid. Could follow normal conversation with normal speed and processing. Was able to answer questions, voiced concerns, discuss current events without difficulty. Cranial Nerves:    Mild right third old and unchanged, EOMs are full no nystagmus appreciated facial motor or sensory intact voice is strong tongue protrudes without atrophy or fasciculations    Motor:   Normal bulk   Slightly increased tone with passive range of motion but difficult to tell whether patient is overriding or not at other times during the exam I was able to move her legs with distraction without difficulty  No tremor appreciated on today's exam  No abnormal movements appreciated on today's exam  Moves extremities spontaneously and with purpose  5/5 x 4      Sensation: Intact to light touch    Coordination/Cerebellar:   FTN intact bilaterally  Patient has intact righting reflex    Gait: Ambulates independently    Reflexes:  Symmetrical    Fall risk assessment  No flowsheet data found. Follow-up and Dispositions    · Return in about 6 months (around 11/24/2022) for In office appointment.            Hiral Sin MS, ANP-BC, [facility]

## 2022-05-24 NOTE — PROGRESS NOTES
Chief Complaint   Patient presents with    Follow-up     yearly follow up states that she is having some stomach issure but here for the yearly medication followup

## 2022-05-25 NOTE — TELEPHONE ENCOUNTER
Confirmed speaking with Kim Guerra and advised that she left the phone number for patient nephew but not the name. Phone number is [phone] and name is Luchostar Nuñez.

## 2022-05-26 NOTE — TELEPHONE ENCOUNTER
All of this just needs to be updated in the patient's chart with the nephew's phone number being the primary phone number as contact

## 2022-05-27 DIAGNOSIS — Z51.81 THERAPEUTIC DRUG MONITORING: ICD-10-CM

## 2022-05-27 DIAGNOSIS — G40.009 PARTIAL IDIOPATHIC EPILEPSY WITH SEIZURES OF LOCALIZED ONSET, NOT INTRACTABLE, WITHOUT STATUS EPILEPTICUS (HCC): Primary | ICD-10-CM

## 2022-06-16 ENCOUNTER — HOSPITAL ENCOUNTER (OUTPATIENT)
Dept: NON INVASIVE DIAGNOSTICS | Age: 62
Discharge: HOME | End: 2022-06-16
Attending: EMERGENCY MEDICINE
Payer: MEDICARE

## 2022-06-16 ENCOUNTER — HOSPITAL ENCOUNTER (OUTPATIENT)
Dept: MRI IMAGING | Age: 62
Discharge: HOME | End: 2022-06-16
Payer: MEDICARE

## 2022-06-16 ENCOUNTER — HOSPITAL ENCOUNTER (OUTPATIENT)
Dept: NEUROLOGY | Age: 62
Discharge: HOME | End: 2022-06-16
Payer: MEDICARE

## 2022-06-16 ENCOUNTER — HOSPITAL ENCOUNTER (OUTPATIENT)
Dept: NON INVASIVE DIAGNOSTICS | Age: 62
Discharge: HOME | End: 2022-06-16
Payer: MEDICARE

## 2022-06-16 DIAGNOSIS — R42 DIZZY SPELLS: ICD-10-CM

## 2022-06-16 DIAGNOSIS — G93.49 ENCEPHALOPATHY CHRONIC: ICD-10-CM

## 2022-06-16 DIAGNOSIS — Q07.9 CONGENITAL ENCEPHALOPATHY (HCC): ICD-10-CM

## 2022-06-16 DIAGNOSIS — G40.009 PARTIAL IDIOPATHIC EPILEPSY WITH SEIZURES OF LOCALIZED ONSET, NOT INTRACTABLE, WITHOUT STATUS EPILEPTICUS (HCC): ICD-10-CM

## 2022-06-16 DIAGNOSIS — R56.9 SEIZURES (HCC): ICD-10-CM

## 2022-06-16 DIAGNOSIS — R29.6 FALLS FREQUENTLY: ICD-10-CM

## 2022-06-16 PROCEDURE — 93225 XTRNL ECG REC<48 HRS REC: CPT

## 2022-06-16 PROCEDURE — A9576 INJ PROHANCE MULTIPACK: HCPCS

## 2022-06-16 PROCEDURE — 74011250636 HC RX REV CODE- 250/636

## 2022-06-16 PROCEDURE — 95816 EEG AWAKE AND DROWSY: CPT

## 2022-06-16 PROCEDURE — 70553 MRI BRAIN STEM W/O & W/DYE: CPT

## 2022-06-16 RX ADMIN — GADOTERIDOL 15 ML: 279.3 INJECTION, SOLUTION INTRAVENOUS at 13:24

## 2022-06-17 NOTE — PROGRESS NOTES
Please call the patient's POA or the supervisor at the facility I am not sure what is the chain of command for contact and let them know that the MRI of the brain did not show any acute issues.   However she has congenital atrophy of the brain and scar tissue of the brain which superimposed on the natural aging process is probably causing her frequent falls    There is probably not much we can do to correct this other than recommending physical therapy and and safety precautions    There are any further questions we can address at in more detail at her next follow-up visit    But there is nothing acute which would be contributing to the falls I just believe this is chronic issues but are just getting magnified due to the normal aging process

## 2022-06-20 PROCEDURE — 95816 EEG AWAKE AND DROWSY: CPT | Performed by: PSYCHIATRY & NEUROLOGY

## 2022-06-20 NOTE — PROCEDURES
[address] [facility]  EEG    Name:  Diane Le  MR#:  [id number]  :  1960  ACCOUNT #:  [de-identified]  DATE OF SERVICE:  2022      REQUESTING PHYSICIAN:  MAZIN Aguiar    HISTORY:  The patient is a 80-year-old female with a history of localization-related epilepsy who is being evaluated for recurrent seizures occurring at least 5-6 times monthly. MEDICATIONS:  Include Tegretol. DESCRIPTION:  This is an 18-channel EEG performed on an awake and drowsy patient. During wakefulness, the dominant posterior background rhythm consists of symmetric, well-modulated medium voltage rhythms in the 9-10 Hz frequency range out of the posterior head region. Faster frequencies along with muscle tension artifact is seen in the frontal areas. Drowsiness is characterized by slowing and vertex waves in the central area. Photic stimulation elicits a symmetric driving response. Hyperventilation was not performed. EEG SUMMARY:  Normal study. CLINICAL INTERPRETATION:  This is a normal EEG during awake and drowsy states of the patient. No clearly lateralizing or epileptiform features were noted. If seizures continue, prolonged inpatient video EEG may be considered for further diagnostic clarification.       Horace Boyd MD      [id number]  D:  2022 10:20  T:  2022 12:18  JOB #:  [id number]

## 2022-06-22 PROCEDURE — 93227 XTRNL ECG REC<48 HR R&I: CPT | Performed by: INTERNAL MEDICINE

## 2022-08-09 ENCOUNTER — OFFICE VISIT (OUTPATIENT)
Dept: NEUROLOGY | Age: 62
End: 2022-08-09
Payer: MEDICARE

## 2022-08-09 VITALS
HEART RATE: 94 BPM | HEIGHT: 63 IN | WEIGHT: 151.8 LBS | SYSTOLIC BLOOD PRESSURE: 132 MMHG | DIASTOLIC BLOOD PRESSURE: 84 MMHG | BODY MASS INDEX: 26.9 KG/M2

## 2022-08-09 DIAGNOSIS — G40.009 PARTIAL IDIOPATHIC EPILEPSY WITH SEIZURES OF LOCALIZED ONSET, NOT INTRACTABLE, WITHOUT STATUS EPILEPTICUS (HCC): Primary | ICD-10-CM

## 2022-08-09 DIAGNOSIS — R29.6 FALLS FREQUENTLY: ICD-10-CM

## 2022-08-09 PROCEDURE — 3017F COLORECTAL CA SCREEN DOC REV: CPT | Performed by: PSYCHIATRY & NEUROLOGY

## 2022-08-09 PROCEDURE — 99215 OFFICE O/P EST HI 40 MIN: CPT | Performed by: PSYCHIATRY & NEUROLOGY

## 2022-08-09 PROCEDURE — G8417 CALC BMI ABV UP PARAM F/U: HCPCS | Performed by: PSYCHIATRY & NEUROLOGY

## 2022-08-09 PROCEDURE — G8427 DOCREV CUR MEDS BY ELIG CLIN: HCPCS | Performed by: PSYCHIATRY & NEUROLOGY

## 2022-08-09 PROCEDURE — G8432 DEP SCR NOT DOC, RNG: HCPCS | Performed by: PSYCHIATRY & NEUROLOGY

## 2022-08-09 RX ORDER — PRIMIDONE 50 MG/1
50 TABLET ORAL 2 TIMES DAILY
Qty: 180 TABLET | Refills: 3 | Status: SHIPPED | OUTPATIENT
Start: 2022-08-09

## 2022-08-09 RX ORDER — BISMUTH SUBSALICYLATE 262 MG
1 TABLET,CHEWABLE ORAL DAILY
COMMUNITY

## 2022-08-09 NOTE — PROGRESS NOTES
Chief Complaint   Patient presents with    Seizure     Has not had a seizure in a week - had four to five in one day before that      1. Have you been to the ER, urgent care clinic since your last visit? Hospitalized since your last visit? Yes for fall - ED [facility] ER last month     2. Have you seen or consulted any other health care providers outside of the [address] [place] [address] since your last visit? Include any pap smears or colon screening.   No

## 2022-08-09 NOTE — LETTER
8/9/2022    Patient: Paty Tinoco   YOB: 1960   Date of Visit: 8/9/2022     Jorge Ghotra, [address] [place] [address] Street [address]  Via Fax: [phone]    Dear CRISTINA Atkins,      Thank you for referring Ms. Paty Tinoco to [address] [place] [address] for evaluation. My notes for this consultation are attached. If you have questions, please do not hesitate to call me. I look forward to following your patient along with you.       Sincerely,    Tyree Parkinson NP

## 2022-08-09 NOTE — ASSESSMENT & PLAN NOTE
No clear etiology was ever determined but work-up including 48-hour Holter monitor was unremarkable    Symptoms seem to improve with physical therapy and subsequently reduction of the 50 mg primidone from twice a day to once a day    Due to the fact that patient had a flurry of seizures the facility is requesting increase back to 50 mg twice a day which I think is reasonable however she starts to have falls again she will have to be reduced and we will have to rethink our seizure strategy    She is to keep her regularly scheduled appointment in December 2022

## 2022-08-09 NOTE — ASSESSMENT & PLAN NOTE
Reasonable to increase the primidone back to 50 mg twice daily  Additionally she is to continue on primidone 250 mg 1 tablet 3 times a day  And continue on Tegretol brand-name only 200 mg 2 tablets 3 times a day    Previously patient had been a bit toxic on the phenobarb/primidone levels which was also a reason why read reduce the primidone 50 mg to once a day think he may also been having an effect on the patient's falls    If patient goes back to falling frequently with the increased dose we will have to work on reducing the primidone and adjusting her medicines differently    We will repeat levels when I see her back in December

## 2022-08-09 NOTE — PATIENT INSTRUCTIONS
As per discussion    I have increased the primidone also known as Mysoline 50 mg to total of 1 tablet twice a day  A new prescription was sent to your pharmacy  All other medications remain unchanged    I will see you back in the office in December for your follow-up visit    And if we need to do virtual based visits it is much preferred that we do an actual telehealth virtual face-to-face visit then a phone visit I am able to ascertain a better assessment that way      Office Policies      Appointments  Please make sure that you arrive for your next appointment at least 15 minutes prior to your appointment time. If for some reason you are going to be late please notify the office to determine if you need to be rescheduled or we can adjust your appointment time      Phone calls/patient messages:  Please allow up to 24 hours for someone in the office to contact you about your call or message. Be mindful your provider may be out of the office or your message may require further review. We encourage you to use [employer] for your messages as this is a faster, more efficient way to communicate with our office    Medication Refills:  Prescription medications require up to 48 business hours to process. We encourage you to use [employer] for your refills. For controlled medications: Please allow up to 72 business hours to process. Certain medications may require you to  a written prescription at our office. NO narcotic/controlled medications will be prescribed after 4pm Monday through Friday or on weekends    Form/Paperwork Completion:  We ask that you allow 7-14 business days. You may also download your forms to [employer] to have your doctor print off.

## 2022-08-09 NOTE — PROGRESS NOTES
[facility]   VIRTUAL [facility]LLOW-UP VISIT     Heath Joel is a 65 y.o. female who was seen by synchronous (real-time) audio-video technology on 8/9/2022. Heath Joel is a 65 y.o. female who presents today for the following:  Chief Complaint   Patient presents with    Seizure     Has not had a seizure in a week - had four to five in one day before that          [address]  1. Partial idiopathic epilepsy with seizures of localized onset, not intractable, without status epilepticus ([facility] [address]  Assessment & Plan:  Reasonable to increase the primidone back to 50 mg twice daily  Additionally she is to continue on primidone 250 mg 1 tablet 3 times a day  And continue on Tegretol brand-name only 200 mg 2 tablets 3 times a day    Previously patient had been a bit toxic on the phenobarb/primidone levels which was also a reason why read reduce the primidone 50 mg to once a day think he may also been having an effect on the patient's falls    If patient goes back to falling frequently with the increased dose we will have to work on reducing the primidone and adjusting her medicines differently    We will repeat levels when I see her back in December      Orders:  -     primidone (MYSOLINE) 50 mg tablet; Take 1 Tablet by mouth two (2) times a day. Indications: epilepsy, Normal, Disp-180 Tablet, R-3  2.  Falls frequently  Assessment & Plan:   No clear etiology was ever determined but work-up including 48-hour Holter monitor was unremarkable    Symptoms seem to improve with physical therapy and subsequently reduction of the 50 mg primidone from twice a day to once a day    Due to the fact that patient had a flurry of seizures the facility is requesting increase back to 50 mg twice a day which I think is reasonable however she starts to have falls again she will have to be reduced and we will have to rethink our seizure strategy    She is to keep her regularly scheduled appointment in December 2022    Patient and/or family was given time to ask questions and voice concerns. I believe all questions concerns were adequately addressed at this  office visit. Patient and/or family also verbalized agreement and understanding of the above-stated plan    Patient remains a complex patient secondary to polypharmacy, significant comorbid conditions, and use of high-risk medications which complicate the decision making process related to patient's neurologic diagnosis        ICD-10-CM ICD-9-CM    1. Partial idiopathic epilepsy with seizures of localized onset, not intractable, without status epilepticus ([facility]  G40.009 345.50 primidone (MYSOLINE) 50 mg tablet      2. Falls frequently  R29.6 V15.88               I attest that 40 minutes was spent on today's visit reviewing medical records and diagnostic testing deemed pertinent to this patient's care, along with direct time spent at patient's visit including the history, physical assessment and plan, discussing diagnosis and management along with documentation. HPI  Historical Data  Historical Data  Patient is known to the practice  Shirley Kaiser is a female with history of epilepsy and congenital encephalopathy     Patient was initially seen by Dr. Rita Buckley (neurology) 3/2/2018 for epilepsy. Note mentions patient having 5-6 seizures per month. History of congenital static encephalopathy. Lives with her mother. Previously being followed by a physician in [place]. Having seizures since age 7 months. Patient is on primidone 250 mg TID and Tegretol 400 mg twice daily. Tegretol is brand-name only as she has significant increase in seizures on generic to almost daily; prescription states TID   Primidone level was 5.4 and phenobarbital derivative was 9. Carbamazepine level was 8.6. Primidone 50 mg twice daily was added to her regimen.        Patient was seen on follow-up 9/7/2018 and note mentions patient doing well since primidone was increased. Patient still having breakthrough seizures several times a month which composed of contortion of the upper body with arms rising and head turning and making noises. No loss of consciousness and it does not generalized. Rubs hands together or stand still w starring lasts several mins and is postictal for a bit \"makes her tired\" and she knows everything going on around her      Plan was to continue her current regiment of 300 mg of primidone 3 times a day and carbamazepine 400 mg twice daily. Primidone level was slightly elevated at 12.2 and phenobarbital level was 30. Patient was then seen by Dr. Allison Bravo 5/3/2019 and note mentions patient having no breakthrough seizures since the last visit. Exam noted some unsteadiness to her gait and nystagmus on lateral gaze. Labs done revealed elevated carbamazepine level at 13, elevated primidone level at 19.2 and phenobarbital level of 31. Patient was told to decrease her 50 mg primidone to twice daily instead of 3 times daily. She can still have spells at most 5-6 and a month. She continues to take carbamazepine 200 mg, 2 tabs  3 times a day and primidone 250 mg 3 times daily plus 50 mg twice daily. Denies any side effects. Interim Data:      With the patient today included: [employer] pt's care giver through the [employer] system  She is  living in a residential facility  [facility]: , work with [employer]; professional support   She to started in April, 2022]    Patient is being worked in today secondary to having increased seizures  July 4th 2022: multiple seizures   Apparently none of these were witnessed but were reported by the patient to the caregiver   Where she would just have an arching of her back that lasted for a minute or so at various times throughout the day  There were no issues related to fever illness or malabsorption at this time  No missed doses reported  The only thing that her changes we reduced her primidone 50 mg from twice a day down to once a day due to falls thinking it might be a medication effect  Prior to this change there was no evidence of any seizure or seizure-like activity that anyone is aware of  However patient did have toxic levels of phenobarb/primidone on twice daily dosing and so there was a concern regarding that which is another reason why the primidone was reduced on the 50 mg just down to once a day  Repeat levels on the lower dose was not completed  No further flurry issues or breakthrough seizures since July 4, 2022      Frequent falls  New concerns as of office visit 12/16/2021  Having some falls: 6 falls in the past 4 months  No injuries only some minor scrapes  Patient states she sometimes feels dizzy but it is nothing to do with when she falls  Knows that she is walking around the next thing she knows she is on the floor  To the best that she can tell there is no loss of consciousness   states she was seen by orthopedic recently and this was felt not to be related to an orthopedic problem and was told to see neurology because they felt that it was more neurologic  OV 5/24/22: continues with multiple falls without LOC or significant injury   Testing that was ordered back in December 2021 was really never completed with the exception of the blood work which does show elevated AED levels   Patient denies evidence of dizziness lightheadedness paresthesias and balance she just tends to fall   Manager did  witness one of them when she was going up the stairs and the patient tripped   Another 1 occurred while the manager was on duty but did not witness it and patient had a very small cut on her lip from this  [address] 8/9/2022: Doing much better related to falls after physical therapy        Pertinent diagnostic data  Routine EEG completed 6/16/2022 interpreted by Dr. Diego Camejo  EEG SUMMARY:  Normal study.      CLINICAL INTERPRETATION:  This is a normal EEG during awake and drowsy states of the patient. No clearly lateralizing or epileptiform features were noted. If seizures continue, prolonged inpatient video EEG may be considered for further diagnostic clarification. This is Marc Salcedo nurse practitioner    48-hour Holter monitor completed 6/16/2022  Normal      Orders Only on 05/24/2022   Component Date Value Ref Range Status    Phenobarbital 05/24/2022 45.9 (A) 15.0 - 40.0 ug/mL Final    Primidone, serum 05/24/2022 16.8 (A) 5.0 - 12.0 ug/mL Final    Comment: (NOTE)                                 Detection Limit = 0.3                          <0.3 indicates None Detected  Patient drug level exceeds published reference range. Evaluate  clinically for signs of potential toxicity.       Phenobarbital 05/24/2022 40  15 - 40 ug/mL Final    Comment: (NOTE)                                 Detection Limit = 3  Performed At: [facility] & [address] [id number]  Kan Nicholas MD [id number]      Carbamazepine 05/24/2022 15.0 (A) 4.0 - 12.0 ug/mL Final   Admission on 05/10/2022, Discharged on 05/11/2022   Component Date Value Ref Range Status    WBC 05/10/2022 7.2  3.6 - 11.0 K/uL Final    RBC 05/10/2022 3.73 (A) 3.80 - 5.20 M/uL Final    HGB 05/10/2022 12.0  11.5 - 16.0 g/dL Final    HCT 05/10/2022 36.8  35.0 - 47.0 % Final    MCV 05/10/2022 98.7  80.0 - 99.0 FL Final    MCH 05/10/2022 32.2  26.0 - 34.0 PG Final    MCHC 05/10/2022 32.6  30.0 - 36.5 g/dL Final    RDW 05/10/2022 13.2  11.5 - 14.5 % Final    PLATELET 52/70/4733 124  150 - 400 K/uL Final    MPV 05/10/2022 11.8  8.9 - 12.9 FL Final    NRBC 05/10/2022 0.0  0  WBC Final    ABSOLUTE NRBC 05/10/2022 0.00  0.00 - 0.01 K/uL Final    NEUTROPHILS 05/10/2022 58  32 - 75 % Final    LYMPHOCYTES 05/10/2022 29  12 - 49 % Final    MONOCYTES 05/10/2022 8  5 - 13 % Final    EOSINOPHILS 05/10/2022 4  0 - 7 % Final    BASOPHILS 05/10/2022 1  0 - 1 % Final    IMMATURE GRANULOCYTES 05/10/2022 0  0.0 - 0.5 % Final    ABS. NEUTROPHILS 05/10/2022 4.2  1.8 - 8.0 K/UL Final    ABS. LYMPHOCYTES 05/10/2022 2.1  0.8 - 3.5 K/UL Final    ABS. MONOCYTES 05/10/2022 0.6  0.0 - 1.0 K/UL Final    ABS. EOSINOPHILS 05/10/2022 0.3  0.0 - 0.4 K/UL Final    ABS. BASOPHILS 05/10/2022 0.0  0.0 - 0.1 K/UL Final    ABS. IMM. GRANS. 05/10/2022 0.0  0.00 - 0.04 K/UL Final    DF 05/10/2022 AUTOMATED    Final    Sodium 05/10/2022 140  136 - 145 mmol/L Final    Potassium 05/10/2022 3.9  3.5 - 5.1 mmol/L Final    Chloride 05/10/2022 109 (A) 97 - 108 mmol/L Final    CO2 05/10/2022 26  21 - 32 mmol/L Final    Anion gap 05/10/2022 5  5 - 15 mmol/L Final    Glucose 05/10/2022 97  65 - 100 mg/dL Final    BUN 05/10/2022 18  6 - 20 MG/DL Final    Creatinine 05/10/2022 0.82  0.55 - 1.02 MG/DL Final    BUN/Creatinine ratio 05/10/2022 22 (A) 12 - 20   Final    GFR est AA 05/10/2022 >60  >60 ml/min/1.73m2 Final    GFR est non-AA 05/10/2022 >60  >60 ml/min/1.73m2 Final    Estimated GFR is calculated using the IDMS-traceable Modification of Diet in Renal Disease (MDRD) Study equation, reported for both  Americans (GFRAA) and non- Americans (GFRNA), and normalized to 1.73m2 body surface area. The physician must decide which value applies to the patient. Calcium 05/10/2022 8.5  8.5 - 10.1 MG/DL Final    Bilirubin, total 05/10/2022 0.1 (A) 0.2 - 1.0 MG/DL Final    ALT (SGPT) 05/10/2022 26  12 - 78 U/L Final    AST (SGOT) 05/10/2022 25  15 - 37 U/L Final    Alk.  phosphatase 05/10/2022 166 (A) 45 - 117 U/L Final    Protein, total 05/10/2022 8.3 (A) 6.4 - 8.2 g/dL Final    Albumin 05/10/2022 3.2 (A) 3.5 - 5.0 g/dL Final    Globulin 05/10/2022 5.1 (A) 2.0 - 4.0 g/dL Final    A-G Ratio 05/10/2022 0.6 (A) 1.1 - 2.2   Final    Crossmatch Expiration 05/10/2022 05/13/2022,2359   Final    ABO/Rh(D) 05/10/2022 O POSITIVE   Final    Antibody screen 05/10/2022 NEG   Final    Color 05/11/2022 YELLOW/STRAW    Final    Color Reference Range: Straw, Yellow or Dark Yellow    Appearance 05/11/2022 CLEAR  CLEAR   Final    Specific gravity 05/11/2022 1.028    Final    pH (UA) 05/11/2022 7.0  5.0 - 8.0   Final    Protein 05/11/2022 Negative  NEG mg/dL Final    Glucose 05/11/2022 Negative  NEG mg/dL Final    Ketone 05/11/2022 Negative  NEG mg/dL Final    Bilirubin 05/11/2022 Negative  NEG   Final    Blood 05/11/2022 MODERATE (A) NEG   Final    Urobilinogen 05/11/2022 0.2  0.2 - 1.0 EU/dL Final    Nitrites 05/11/2022 Negative  NEG   Final    Leukocyte Esterase 05/11/2022 SMALL (A) NEG   Final    WBC 05/11/2022 0-4  0 - 4 /hpf Final    RBC 05/11/2022 20-50  0 - 5 /hpf Final    Epithelial cells 05/11/2022 FEW  FEW /lpf Final    Epithelial cell category consists of squamous cells and /or transitional urothelial cells. Renal tubular cells, if present, are separately identified as such. Bacteria 05/11/2022 Negative  NEG /hpf Final    UA:UC IF INDICATED 05/11/2022 CULTURE NOT INDICATED BY UA RESULT  CNI   Final      Results from [facility] encounter on 06/16/22    MRI BRAIN W WO CONT    Impression  1. Relatively symmetric marked atrophy and encephalomalacia in the bilateral  parieto-occipital lobes, and also extending into the bilateral frontal lobes,  left worse than right. 2. No acute intracranial abnormality. Allergies   Allergen Reactions    Doxycycline Nausea and Vomiting    Indomethacin Other (comments)     Loopy and out of her mind    Paroxetine Other (comments)     Head shakes      Sulfa (Sulfonamide Antibiotics) Other (comments)     Never taken       Current Outpatient Medications   Medication Sig    multivitamin (ONE A DAY) tablet Take 1 Tablet by mouth in the morning. primidone (MYSOLINE) 50 mg tablet Take 1 Tablet by mouth two (2) times a day. Indications: epilepsy    pantoprazole (PROTONIX) 40 mg tablet Take 40 mg by mouth in the morning.     acetaminophen (TYLENOL) 325 mg tablet Take  by mouth every four (4) hours as needed for Pain.    promethazine/dextromethorphan (PROMETHAZINE-DM PO) Take  by mouth.    primidone (MYSOLINE) 250 mg tablet 1 TABLET BY MOUTH 3 TIMES A DAY    cetirizine (ZYRTEC) 10 mg tablet Take 10 mg by mouth in the morning. diclofenac (VOLTAREN) 1 % gel Apply 2 g to affected area. hydrOXYzine HCL (ATARAX) 25 mg tablet 25 mg three (3) times daily as needed. loperamide (IMMODIUM) 2 mg tablet Take 2 mg by mouth as needed. Emetrol soln solution     TEGretoL 200 mg tablet Take 2 Tablets by mouth three (3) times daily. Brand-name medically necessary  Indications: convulsive seizures    furosemide (LASIX) 20 mg tablet 1-2 tablets as needed for swelling    ergocalciferol (ERGOCALCIFEROL) 1,250 mcg (50,000 unit) capsule TAKE 1 CAPSULE BY MOUTH ONE TIME PER WEEK    atorvastatin (LIPITOR) 20 mg tablet Take 20 mg by mouth in the morning. Estrace 0.01 % (0.1 mg/gram) vaginal cream INSERT 1/2 GM VAGINALLY TWICE WEEKLY     No current facility-administered medications for this visit. Past medical history/surgical history, family history, and social history have been reviewed for today's visit      ROS    A ten system review of constitutional, cardiovascular, respiratory, musculoskeletal, endocrine, skin, SHEENT, genitourinary, psychiatric and neurologic systems was obtained and is unremarkable except as mentioned under HPI          EXAMINATION: Vital signs as reported by the facility    Visit Vitals  /84 (BP 1 Location: Left upper arm, BP Patient Position: Sitting, BP Cuff Size: Small adult)   Pulse 94   Ht 5' 3\" (1.6 m)   Wt 151 lb 12.8 oz (68.9 kg)   BMI 26.89 kg/m²           General appearance: Patient is well-developed and well-nourished in no apparent distress and well groomed.     Psych/mental health:  Affect: Appropriate    PHQ  3 most recent PHQ Screens 8/9/2022   Little interest or pleasure in doing things Not at all   Feeling down, depressed, irritable, or hopeless Not at all   Total Score PHQ 2 0 HEENT:   Normocephalic  With evidence of trauma: No  Full range of motion head neck: Yes  Tenderness to palpation of the head neck region: N/A      Cardiovascular:     Extremities warm to touch: N/A  Extremity swelling: No  Discoloration: No  Evidence of PVD: No    Respiratory:   Dyspnea on exertion: No   Abnormal effort on casual observation: No   Use of portable oxygen: No   Evidence of cyanosis: No     Musculoskeletal:   Evidence of significant bone deformities: No   Spinal curvature: No     Integumentary:    Obvious bruising: No   Lacerations or discoloration on casual observation: No       Neurological Examination:   Mental Status:        MMSE  No flowsheet data found. Formal testing was not completed  Within the context of the patient's baseline cognitive basis:   there was nothing concerning on general observation and discussion. Speech was fluid. Could follow normal conversation with normal speed and processing. Was able to answer questions, voiced concerns, discuss current events without difficulty.           Cranial Nerves:    Mild right third old and unchanged, EOMs are full no nystagmus appreciated facial motor or sensory intact voice is strong tongue protrudes without atrophy or fasciculations    Motor:   Patient was able to walk around the room and showed no abnormalities on casual observation previously on in person exam:  Normal bulk   Slightly increased tone with passive range of motion but difficult to tell whether patient is overriding or not at other times during the exam I was able to move her legs with distraction without difficulty  No tremor appreciated on today's exam  No abnormal movements appreciated on today's exam  Moves extremities spontaneously and with purpose  5/5 x 4      Sensation: Intact to light touch    Coordination/Cerebellar:   Grossly intact on today's exam previously on in person examination:  FTN intact bilaterally  Patient has intact righting reflex    Gait: Ambulates independently    Reflexes: On previous exam: Symmetrical    Fall risk assessment  No flowsheet data found. Follow-up and Dispositions    Return for Keep previously scheduled appointment, In office appointment. Due to this being a TeleHealth evaluation, many elements of the physical examination are unable to be assessed. Pursuant to the emergency declaration under the [address] [place] [address], [facility] waiver authority and the [employer] and [employer] Act, this Virtual  Visit was conducted, with patient's consent, to reduce the patient's risk of exposure to COVID-19 and provide continuity of care for an established patient. Services were provided through a video synchronous discussion virtually to substitute for in-person clinic visit.           Royer Light MS, ANP-BC, [facility]

## 2022-12-01 ENCOUNTER — OFFICE VISIT (OUTPATIENT)
Dept: NEUROLOGY | Age: 62
End: 2022-12-01
Payer: MEDICARE

## 2022-12-01 VITALS
BODY MASS INDEX: 28.21 KG/M2 | HEART RATE: 70 BPM | TEMPERATURE: 98.1 F | OXYGEN SATURATION: 99 % | DIASTOLIC BLOOD PRESSURE: 78 MMHG | SYSTOLIC BLOOD PRESSURE: 127 MMHG | RESPIRATION RATE: 18 BRPM | WEIGHT: 159.2 LBS | HEIGHT: 63 IN

## 2022-12-01 DIAGNOSIS — R56.9 SEIZURES (HCC): ICD-10-CM

## 2022-12-01 DIAGNOSIS — R29.6 FALLS FREQUENTLY: ICD-10-CM

## 2022-12-01 DIAGNOSIS — Q07.9 CONGENITAL ENCEPHALOPATHY (HCC): ICD-10-CM

## 2022-12-01 DIAGNOSIS — G40.009 PARTIAL IDIOPATHIC EPILEPSY WITH SEIZURES OF LOCALIZED ONSET, NOT INTRACTABLE, WITHOUT STATUS EPILEPTICUS (HCC): Primary | ICD-10-CM

## 2022-12-01 DIAGNOSIS — R42 DIZZY SPELLS: ICD-10-CM

## 2022-12-01 RX ORDER — PRIMIDONE 250 MG/1
TABLET ORAL
Qty: 93 TABLET | Refills: 5 | Status: SHIPPED | OUTPATIENT
Start: 2022-12-01

## 2022-12-01 RX ORDER — CARBAMAZEPINE 200 MG/1
400 TABLET ORAL 3 TIMES DAILY
Qty: 180 TABLET | Refills: 12 | Status: SHIPPED | OUTPATIENT
Start: 2022-12-01

## 2022-12-01 NOTE — PROGRESS NOTES
[facility]   In Office FOLLOW-UP VISIT     Bernie Gordillo is a 65 y.o. female who was seen by synchronous (real-time) audio-video technology on 12/1/2022. Bernie Gordillo is a 65 y.o. female who presents today for the following:  Chief Complaint   Patient presents with    Follow-up    Epilepsy         ASSESSMENT AND PLAN  1. Partial idiopathic epilepsy with seizures of localized onset, not intractable, without status epilepticus ([facility] [address]  Assessment & Plan:  Stable and well-controlled  No further seizures since adjustments in medication at last office visit    Patient remains on polypharmacy high-dose primidone along with brand-name only Tegretol    Recommend lab work to be completed which was ordered back in May 2022  Orders:  -     TEGretoL 200 mg tablet; Take 2 Tablets by mouth three (3) times daily. Brand-name medically necessary  Indications: convulsive seizures, Normal, Disp-180 Tablet, R-12, MONIK  -     primidone (MYSOLINE) 250 mg tablet; 1 TABLET BY MOUTH 3 TIMES A DAY  Indications: convulsive seizures, Normal, Disp-93 Tablet, R-5  2. Seizures (HCC)  -     TEGretoL 200 mg tablet; Take 2 Tablets by mouth three (3) times daily. Brand-name medically necessary  Indications: convulsive seizures, Normal, Disp-180 Tablet, R-12, MONIK  -     primidone (MYSOLINE) 250 mg tablet; 1 TABLET BY MOUTH 3 TIMES A DAY  Indications: convulsive seizures, Normal, Disp-93 Tablet, R-5  3. Congenital encephalopathy ([facility] [address]  Assessment & Plan:  Functionally patient remains stable and unchanged and is easily able to make her needs and concerns known  4. Falls frequently  Assessment & Plan:  After full work-up, felt to be possibly related to medication effect due to primidone  Medication was slightly reduced and was given physical therapy    Stable and doing well presently    Fall precautions encouraged  5.  Dizzy spells  Assessment & Plan:   No further complaints to date      Patient and/or family was given time to ask questions and voice concerns. I believe all questions concerns were adequately addressed at this  office visit. Patient and/or family also verbalized agreement and understanding of the above-stated plan    Patient remains a complex patient secondary to polypharmacy, significant comorbid conditions, and use of high-risk medications which complicate the decision making process related to patient's neurologic diagnosis        ICD-10-CM ICD-9-CM    1. Partial idiopathic epilepsy with seizures of localized onset, not intractable, without status epilepticus ([facility] [address]  G40.009 345.50 TEGretoL 200 mg tablet      primidone (MYSOLINE) 250 mg tablet      2. Seizures (HCC)  R56.9 780.39 TEGretoL 200 mg tablet      primidone (MYSOLINE) 250 mg tablet      3. Congenital encephalopathy (HCC)  Q07.9 742.9       4. Falls frequently  R29.6 V15.88       5. Dizzy spells  R42 780.4         I attest that 30 minutes was spent on today's visit reviewing medical records and diagnostic testing deemed pertinent to this patient's care, along with direct time spent at patient's visit including the history, physical assessment and plan, discussing diagnosis and management along with documentation. HPI  Historical Data  Historical Data  Patient is known to the practice  Lsahonda Frausto is a female with history of epilepsy and congenital encephalopathy     Patient was initially seen by Dr. Sudhir Todd (neurology) 3/2/2018 for epilepsy. Note mentions patient having 5-6 seizures per month. History of congenital static encephalopathy. Lives with her mother. Previously being followed by a physician in [place]. Having seizures since age 7 months. Patient is on primidone 250 mg TID and Tegretol 400 mg twice daily. Tegretol is brand-name only as she has significant increase in seizures on generic to almost daily; prescription states TID   Primidone level was 5.4 and phenobarbital derivative was 9. Carbamazepine level was 8.6. Primidone 50 mg twice daily was added to her regimen. Patient was seen on follow-up 9/7/2018 and note mentions patient doing well since primidone was increased. Patient still having breakthrough seizures several times a month which composed of contortion of the upper body with arms rising and head turning and making noises. No loss of consciousness and it does not generalized. Rubs hands together or stand still w starring lasts several mins and is postictal for a bit \"makes her tired\" and she knows everything going on around her      Plan was to continue her current regiment of 300 mg of primidone 3 times a day and carbamazepine 400 mg twice daily. Primidone level was slightly elevated at 12.2 and phenobarbital level was 30. Patient was then seen by Dr. Norah Mcnamara 5/3/2019 and note mentions patient having no breakthrough seizures since the last visit. Exam noted some unsteadiness to her gait and nystagmus on lateral gaze. Labs done revealed elevated carbamazepine level at 13, elevated primidone level at 19.2 and phenobarbital level of 31. Patient was told to decrease her 50 mg primidone to twice daily instead of 3 times daily. She can still have spells at most 5-6 and a month. She continues to take carbamazepine 200 mg, 2 tabs  3 times a day and primidone 250 mg 3 times daily plus 50 mg twice daily. Denies any side effects. Interim Data:      With the patient today included: Klaudia Platt pt's care giver through the [address] [place] [address] system  She is  living in a residential facility  [facility]: , work with [address] [place] [address]; professional support   She to started in April, 2022]      Seizure disorder  Current AEDs  Mysoline 250 mg 1 tablet 3 times a day  Mysoline 50 mg 1 tablet twice a day [will be tried to reduce it down to once a day patient had significant bout of seizure flurries]  Tegretol brand-name only 200 mg p.o. taking 2 tablets 3 times a day        Seizure events/last known seizure:  July 4, 2022 had seizure flurries    Pt remains well controlled on current medications. There are no side effects reported    There is been no change in behavior    There is no evidence of depression related to AEDs    Patient is adherent to medication protocol    There are no issues related to malabsorption    There is no significant alcohol consumption    Driving:  Patient does not drive          Frequent falls  New concerns as of office visit 12/16/2021  Having some falls: 6 falls in the past 4 months  No injuries only some minor scrapes  Patient states she sometimes feels dizzy but it is nothing to do with when she falls  Knows that she is walking around the next thing she knows she is on the floor  To the best that she can tell there is no loss of consciousness   states she was seen by orthopedic recently and this was felt not to be related to an orthopedic problem and was told to see neurology because they felt that it was more neurologic  OV 5/24/22: continues with multiple falls without LOC or significant injury   Testing that was ordered back in December 2021 was really never completed with the exception of the blood work which does show elevated AED levels   Patient denies evidence of dizziness lightheadedness paresthesias and balance she just tends to fall   Manager did  witness one of them when she was going up the stairs and the patient tripped   Another 1 occurred while the manager was on duty but did not witness it and patient had a very small cut on her lip from this  [address] 8/9/2022: Doing much better related to falls after physical therapy  OV 12/1/22: Patient only had 1 fall since last office visit and that was secondary to the fact that she tripped over her nightgown      Pertinent diagnostic data  Routine EEG completed 6/16/2022 interpreted by Dr. Caleb Lorenzana  EEG SUMMARY:  Normal study.      CLINICAL INTERPRETATION:  This is a normal EEG during awake and drowsy states of the patient. No clearly lateralizing or epileptiform features were noted. If seizures continue, prolonged inpatient video EEG may be considered for further diagnostic clarification. This is Kellie Art nurse practitioner    48-hour Holter monitor completed 6/16/2022  Normal      No visits with results within 6 Month(s) from this visit. Latest known visit with results is:   Orders Only on 05/24/2022   Component Date Value Ref Range Status    Phenobarbital 05/24/2022 45.9 (A)  15.0 - 40.0 ug/mL Final    Primidone, serum 05/24/2022 16.8 (A)  5.0 - 12.0 ug/mL Final    Comment: (NOTE)                                 Detection Limit = 0.3                          <0.3 indicates None Detected  Patient drug level exceeds published reference range. Evaluate  clinically for signs of potential toxicity. Phenobarbital 05/24/2022 40  15 - 40 ug/mL Final    Comment: (NOTE)                                 Detection Limit = 3  Performed At: [facility] & [address] [id number]  Cynthia Godinez MD IA:[id number]      Carbamazepine 05/24/2022 15.0 (A)  4.0 - 12.0 ug/mL Final      Results from Hospital Encounter encounter on 06/16/22    MRI BRAIN W WO CONT    Impression  1. Relatively symmetric marked atrophy and encephalomalacia in the bilateral  parieto-occipital lobes, and also extending into the bilateral frontal lobes,  left worse than right. 2. No acute intracranial abnormality. Allergies   Allergen Reactions    Doxycycline Nausea and Vomiting    Indomethacin Other (comments)     Loopy and out of her mind    Paroxetine Other (comments)     Head shakes      Sulfa (Sulfonamide Antibiotics) Other (comments)     Never taken       Current Outpatient Medications   Medication Sig    TEGretoL 200 mg tablet Take 2 Tablets by mouth three (3) times daily.  Brand-name medically necessary  Indications: convulsive seizures    primidone (MYSOLINE) 250 mg tablet 1 TABLET BY MOUTH 3 TIMES A DAY  Indications: convulsive seizures    multivitamin (ONE A DAY) tablet Take 1 Tablet by mouth in the morning. primidone (MYSOLINE) 50 mg tablet Take 1 Tablet by mouth two (2) times a day. Indications: epilepsy    pantoprazole (PROTONIX) 40 mg tablet Take 40 mg by mouth in the morning. acetaminophen (TYLENOL) 325 mg tablet Take  by mouth every four (4) hours as needed for Pain. cetirizine (ZYRTEC) 10 mg tablet Take 10 mg by mouth in the morning.    hydrOXYzine HCL (ATARAX) 25 mg tablet 25 mg three (3) times daily as needed. loperamide (IMMODIUM) 2 mg tablet Take 2 mg by mouth as needed. Emetrol soln solution     furosemide (LASIX) 20 mg tablet 1-2 tablets as needed for swelling    ergocalciferol (ERGOCALCIFEROL) 1,250 mcg (50,000 unit) capsule TAKE 1 CAPSULE BY MOUTH ONE TIME PER WEEK    atorvastatin (LIPITOR) 20 mg tablet Take 20 mg by mouth in the morning. Estrace 0.01 % (0.1 mg/gram) vaginal cream INSERT 1/2 GM VAGINALLY TWICE WEEKLY     No current facility-administered medications for this visit. Past medical history/surgical history, family history, and social history have been reviewed for today's visit      ROS    A ten system review of constitutional, cardiovascular, respiratory, musculoskeletal, endocrine, skin, SHEENT, genitourinary, psychiatric and neurologic systems was obtained and is unremarkable except as mentioned under HPI          EXAMINATION:     Visit Vitals  /78 (BP 1 Location: Left arm, BP Patient Position: Sitting, BP Cuff Size: Adult)   Pulse 70   Temp 98.1 °F (36.7 °C) (Temporal)   Resp 18   Ht 5' 3\" (1.6 m)   Wt 159 lb 3.2 oz (72.2 kg)   SpO2 99%   BMI 28.20 kg/m²           General appearance: Patient is well-developed and well-nourished in no apparent distress and well groomed.     Psych/mental health:  Affect: Appropriate    PHQ  3 most recent PHQ Screens 12/1/2022   Little interest or pleasure in doing things Not at all   Feeling down, depressed, irritable, or hopeless Not at all   Total Score PHQ 2 0       HEENT:   Normocephalic  With evidence of trauma: No  Full range of motion head neck: Yes  Tenderness to palpation of the head neck region: N/A      Cardiovascular:     Extremities warm to touch: N/A  Extremity swelling: No  Discoloration: No  Evidence of PVD: No    Respiratory:   Dyspnea on exertion: No   Abnormal effort on casual observation: No   Use of portable oxygen: No   Evidence of cyanosis: No     Musculoskeletal:   Evidence of significant bone deformities: No   Spinal curvature: No     Integumentary:    Obvious bruising: No   Lacerations or discoloration on casual observation: No       Neurological Examination:   Mental Status:        MMSE  No flowsheet data found. Formal testing was not completed  Within the context of the patient's baseline cognitive basis:     there was nothing concerning on general observation and discussion. Speech was fluid. Could follow normal conversation with normal speed and processing.   Was able to answer questions, voiced concerns, discuss current events without difficulty  Follows commands without difficulty      Cranial Nerves:    Mild right third old and unchanged, EOMs are full no nystagmus appreciated facial motor or sensory intact voice is strong tongue protrudes without atrophy or fasciculations    Motor:   Patient was able to walk around the room and showed no abnormalities on casual observation previously on in person exam:  Normal bulk   Slightly increased tone with passive range of motion but difficult to tell whether patient is overriding or not at other times during the exam I was able to move her legs with distraction without difficulty  No tremor appreciated on today's exam  No abnormal movements appreciated on today's exam  Moves extremities spontaneously and with purpose  5/5 x 4      Sensation: Intact to light touch      FTN intact bilaterally  Patient has intact righting reflex    Gait: Ambulates independently    Reflexes: Symmetrical    Fall risk assessment  No flowsheet data found. Follow-up and Dispositions    Return in about 6 months (around 6/1/2023). Due to this being a TeleHealth evaluation, many elements of the physical examination are unable to be assessed. Pursuant to the emergency declaration under the [address] [place] [address] waiver authority and the [employer] Act, this Virtual  Visit was conducted, with patient's consent, to reduce the patient's risk of exposure to COVID-19 and provide continuity of care for an established patient. Services were provided through a video synchronous discussion virtually to substitute for in-person clinic visit.           Cristo Chambers MS, ANP-BC, [facility]

## 2022-12-01 NOTE — ASSESSMENT & PLAN NOTE
After full work-up, felt to be possibly related to medication effect due to primidone  Medication was slightly reduced and was given physical therapy    Stable and doing well presently    Fall precautions encouraged

## 2022-12-01 NOTE — LETTER
12/1/2022    Patient: Tesfaye Yanez   YOB: 1960   Date of Visit: 12/1/2022     Lela Newell, [address] [facility] [address]  Via Fax: [phone]    Dear CRISTINA Perla,      Thank you for referring Ms. Tesfaye Yanez to [address] [place] [address] for evaluation. My notes for this consultation are attached. If you have questions, please do not hesitate to call me. I look forward to following your patient along with you.       Sincerely,    Luis Eid, ANP-C

## 2022-12-01 NOTE — ASSESSMENT & PLAN NOTE
Functionally patient remains stable and unchanged and is easily able to make her needs and concerns known

## 2022-12-01 NOTE — PATIENT INSTRUCTIONS
As per discussion    You are doing really well I would not recommend any changes at this time    I would like to get your blood work completed just so we have baseline therapeutic levels on your current doses of medicine    I have renewed the medications that are due to be renewed at this time related to your neurologic diagnosis  If you need a new prescription before we see you back please ask your pharmacy to send us an electronic renewal that is the most efficient method of medication renewal     Continue to be mindful related to walking to help prevent falls    Wishing you a very [employer] and a [facility]      Appointments  Please make sure that you arrive for your next appointment at least 15 minutes prior to your appointment time. If for some reason you are going to be late please notify the office to determine if you need to be rescheduled or we can adjust your appointment time      Phone calls/patient messages:  Please allow up to 24 hours for someone in the office to contact you about your call or message. Be mindful your provider may be out of the office or your message may require further review. We encourage you to use [employer] for your messages as this is a faster, more efficient way to communicate with our office    Medication Refills:  Prescription medications require up to 48 business hours to process. We encourage you to use [employer] for your refills. For controlled medications: Please allow up to 72 business hours to process. Certain medications may require you to  a written prescription at our office. NO narcotic/controlled medications will be prescribed after 4pm Monday through Friday or on weekends    Form/Paperwork Completion:  We ask that you allow 7-14 business days. You may also download your forms to [employer] to have your doctor print off.

## 2022-12-01 NOTE — ASSESSMENT & PLAN NOTE
Stable and well-controlled  No further seizures since adjustments in medication at last office visit    Patient remains on polypharmacy high-dose primidone along with brand-name only Tegretol    Recommend lab work to be completed which was ordered back in May 2022

## 2022-12-01 NOTE — PROGRESS NOTES
Chief Complaint   Patient presents with    Follow-up    Epilepsy    1. Have you been to the ER, urgent care clinic since your last visit? No  Hospitalized since your last visit?  no    2. Have you seen or consulted any other health care providers outside of the [address] [place] [address] since your last visit? Include any pap smears or colon screening.

## 2022-12-22 ENCOUNTER — TELEPHONE (OUTPATIENT)
Dept: NEUROLOGY | Age: 62
End: 2022-12-22

## 2022-12-23 ENCOUNTER — TELEPHONE (OUTPATIENT)
Dept: NEUROLOGY | Age: 62
End: 2022-12-23

## 2022-12-23 NOTE — TELEPHONE ENCOUNTER
Call placed to pharmacy. 2 patient identifiers given. Clarified primidone order with Ole [facility]. Primidone 250mg 1 tab po TID and Primidone 50mg po BID.

## 2022-12-28 DIAGNOSIS — G40.009 PARTIAL IDIOPATHIC EPILEPSY WITH SEIZURES OF LOCALIZED ONSET, NOT INTRACTABLE, WITHOUT STATUS EPILEPTICUS (HCC): ICD-10-CM

## 2023-01-02 RX ORDER — PRIMIDONE 50 MG/1
TABLET ORAL
Qty: 60 TABLET | Refills: 11 | Status: SHIPPED | OUTPATIENT
Start: 2023-01-02

## 2023-02-13 ENCOUNTER — TELEPHONE (OUTPATIENT)
Dept: NEUROLOGY | Age: 63
End: 2023-02-13

## 2023-02-13 NOTE — TELEPHONE ENCOUNTER
RE: TEGretoL ([place]rier: Jany Humphrey)- RENEWAL-       PA Case ID: [id number]  01/01/2023 TO 02/13/2024  Sent to [address] to [place]. [place] B[place] to [place].

## 2023-02-13 NOTE — TELEPHONE ENCOUNTER
Tegretol - forwarded to April to process P.A. Today     Please melo it as urgent - or attempt to call it in - and needs to be brand only from the way the Rx is written.

## 2023-02-21 ENCOUNTER — HOSPITAL ENCOUNTER (EMERGENCY)
Age: 63
Discharge: HOME | End: 2023-02-21
Attending: EMERGENCY MEDICINE
Payer: MEDICARE

## 2023-02-21 ENCOUNTER — APPOINTMENT (OUTPATIENT)
Dept: CT IMAGING | Age: 63
End: 2023-02-21
Attending: EMERGENCY MEDICINE
Payer: MEDICARE

## 2023-02-21 ENCOUNTER — APPOINTMENT (OUTPATIENT)
Dept: GENERAL RADIOLOGY | Age: 63
End: 2023-02-21
Attending: PHYSICIAN ASSISTANT
Payer: MEDICARE

## 2023-02-21 VITALS
DIASTOLIC BLOOD PRESSURE: 77 MMHG | WEIGHT: 153.44 LBS | HEART RATE: 67 BPM | SYSTOLIC BLOOD PRESSURE: 157 MMHG | RESPIRATION RATE: 14 BRPM | BODY MASS INDEX: 27.19 KG/M2 | HEIGHT: 63 IN | TEMPERATURE: 97.7 F | OXYGEN SATURATION: 93 %

## 2023-02-21 DIAGNOSIS — S22.49XA MULTIPLE RIB FRACTURES INVOLVING FOUR OR MORE RIBS: Primary | ICD-10-CM

## 2023-02-21 LAB
ANION GAP BLD CALC-SCNC: 11 (ref 10–20)
BASE EXCESS BLD CALC-SCNC: 0.9 MMOL/L
CA-I BLD-MCNC: 1.2 MMOL/L (ref 1.12–1.32)
CHLORIDE BLD-SCNC: 109 MMOL/L (ref 100–108)
CO2 BLD-SCNC: 27 MMOL/L (ref 19–24)
CREAT UR-MCNC: 0.9 MG/DL (ref 0.6–1.3)
GLUCOSE BLD STRIP.AUTO-MCNC: 92 MG/DL (ref 74–106)
HCO3 BLDA-SCNC: 28 MMOL/L
LACTATE BLD-SCNC: 0.78 MMOL/L (ref 0.4–2)
PCO2 BLDV: 52.5 MMHG (ref 41–51)
PH BLDV: 7.33 (ref 7.32–7.42)
PO2 BLDV: 34 MMHG (ref 25–40)
POTASSIUM BLD-SCNC: 3.6 MMOL/L (ref 3.5–5.5)
SAO2 % BLD: 61 %
SERVICE CMNT-IMP: ABNORMAL
SODIUM BLD-SCNC: 147 MMOL/L (ref 136–145)
SPECIMEN SITE: ABNORMAL

## 2023-02-21 PROCEDURE — 71260 CT THORAX DX C+: CPT

## 2023-02-21 PROCEDURE — 74011000636 HC RX REV CODE- 636: Performed by: EMERGENCY MEDICINE

## 2023-02-21 PROCEDURE — 99285 EMERGENCY DEPT VISIT HI MDM: CPT

## 2023-02-21 PROCEDURE — 82947 ASSAY GLUCOSE BLOOD QUANT: CPT

## 2023-02-21 PROCEDURE — 74177 CT ABD & PELVIS W/CONTRAST: CPT

## 2023-02-21 PROCEDURE — 74011250637 HC RX REV CODE- 250/637: Performed by: EMERGENCY MEDICINE

## 2023-02-21 PROCEDURE — 71101 X-RAY EXAM UNILAT RIBS/CHEST: CPT

## 2023-02-21 RX ORDER — ONDANSETRON 4 MG/1
4 TABLET, ORALLY DISINTEGRATING ORAL
Qty: 20 TABLET | Refills: 0 | Status: SHIPPED | OUTPATIENT
Start: 2023-02-21

## 2023-02-21 RX ORDER — POLYETHYLENE GLYCOL 3350 17 G/17G
17 POWDER, FOR SOLUTION ORAL DAILY
Qty: 289 G | Refills: 0 | Status: SHIPPED | OUTPATIENT
Start: 2023-02-21 | End: 2023-03-07

## 2023-02-21 RX ORDER — OXYCODONE AND ACETAMINOPHEN 5; 325 MG/1; MG/1
1 TABLET ORAL
Status: COMPLETED | OUTPATIENT
Start: 2023-02-21 | End: 2023-02-21

## 2023-02-21 RX ORDER — OXYCODONE AND ACETAMINOPHEN 5; 325 MG/1; MG/1
1 TABLET ORAL
Qty: 15 TABLET | Refills: 0 | Status: SHIPPED | OUTPATIENT
Start: 2023-02-21 | End: 2023-02-28

## 2023-02-21 RX ADMIN — IOPAMIDOL 100 ML: 755 INJECTION, SOLUTION INTRAVENOUS at 13:56

## 2023-02-21 RX ADMIN — OXYCODONE AND ACETAMINOPHEN 1 TABLET: 5; 325 TABLET ORAL at 15:29

## 2023-02-21 NOTE — DISCHARGE INSTRUCTIONS
Tg Palacios has fractures in ribs 9-12 on the right side. There are no other significant internal injuries. I would recommend that she use 1 tablet of Percocet approximately 3 times a day as needed for pain. If she has nausea or vomiting from the Percocet, she can use Zofran as needed. If she has constipation symptoms she can use MiraLAX as needed. For milder pain she can use extra strength Tylenol or ibuprofen 400 mg tablets 3 times a day, as needed. Return to the emergency department if she has worsening pain, shortness of breath, fevers or other concerning symptoms.

## 2023-02-21 NOTE — ED PROVIDER NOTES
EMERGENCY DEPARTMENT HISTORY AND PHYSICAL EXAM    Date: 2/21/2023  Patient Name: Veronika Greene  Patient Age and Sex: 65 y.o. female  MRN:  [id number]  CSN:  [id number]    History of Present Illness     Chief Complaint   Patient presents with    Trygve Staple down 2 steps on Saturday and now has a large bruise on the right side. Reports unable to eat since the fall. History Provided By: Patient and Caregiver    Ability to gather history was limited by:  HPI Limitations : None    HPI: Veronika Greene, 65 y.o. female with history of congenital encephalopathy, MR, seizure disorder, brought to the emergency department by her caregiver for concerns for bruising and pain along her right side and flank. The patient was visiting her family members over the past week, reportedly had a fall 3 to 4 days ago where she fell backward against outdoor stairs. She has developed worsening pain and bruising, pain worsening with movement. Also having decreased appetite secondary to pain apparently. No nausea or vomiting. She is not anticoagulated. She has not complained of any head injury or had any other changes in her mentation. Tobacco Use      Smoking status: Never      Smokeless tobacco: Never     Past History   The patient's medical, surgical, and social history were reviewed by me today. Current Medications:  No current facility-administered medications on file prior to encounter. Current Outpatient Medications on File Prior to Encounter   Medication Sig Dispense Refill    primidone (MYSOLINE) 50 mg tablet 1 TABLET BY MOUTH 2 TIMES A DAY FOR EPILEPSY 60 Tablet 11    TEGretoL 200 mg tablet Take 2 Tablets by mouth three (3) times daily. Brand-name medically necessary  Indications: convulsive seizures 180 Tablet 12    primidone (MYSOLINE) 250 mg tablet 1 TABLET BY MOUTH 3 TIMES A DAY  Indications: convulsive seizures 93 Tablet 5    multivitamin (ONE A DAY) tablet Take 1 Tablet by mouth in the morning. pantoprazole (PROTONIX) 40 mg tablet Take 40 mg by mouth in the morning. acetaminophen (TYLENOL) 325 mg tablet Take  by mouth every four (4) hours as needed for Pain. cetirizine (ZYRTEC) 10 mg tablet Take 10 mg by mouth in the morning.      hydrOXYzine HCL (ATARAX) 25 mg tablet 25 mg three (3) times daily as needed. loperamide (IMMODIUM) 2 mg tablet Take 2 mg by mouth as needed. Emetrol soln solution       furosemide (LASIX) 20 mg tablet 1-2 tablets as needed for swelling      ergocalciferol (ERGOCALCIFEROL) 1,250 mcg (50,000 unit) capsule TAKE 1 CAPSULE BY MOUTH ONE TIME PER WEEK      atorvastatin (LIPITOR) 20 mg tablet Take 20 mg by mouth in the morning. Estrace 0.01 % (0.1 mg/gram) vaginal cream INSERT 1/2 GM VAGINALLY TWICE WEEKLY         Past Medical History:   Diagnosis Date    Seizures ([address]        Past Surgical History:   Procedure Laterality Date    HX TUBAL LIGATION         Social History     Tobacco Use    Smoking status: Never    Smokeless tobacco: Never   Vaping Use    Vaping Use: Never used   Substance Use Topics    Alcohol use: No       Allergies: Allergies   Allergen Reactions    Doxycycline Nausea and Vomiting    Indomethacin Other (comments)     Loopy and out of her mind    Paroxetine Other (comments)     Head shakes      Sulfa (Sulfonamide Antibiotics) Other (comments)     Never taken     Review of Systems   A Review of Systems was reviewed by me today during this encounter. Pertinent positive and negative elements are noted in the HPI and MDM sections. Review of Systems   Constitutional:  Negative for fatigue and fever. Respiratory:  Negative for shortness of breath. Cardiovascular:  Positive for chest pain. Gastrointestinal:  Negative for abdominal pain. All other systems reviewed and are negative.     Physical Exam   Vital Signs  Patient Vitals for the past 8 hrs:   Temp Pulse Resp BP SpO2   02/21/23 0955 97.7 °F (36.5 °C) 67 14 (!) 157/77 93 % Physical Exam  Vitals and nursing note reviewed. Constitutional:       General: She is not in acute distress. Appearance: Normal appearance. She is well-developed. She is not ill-appearing. HENT:      Head: Normocephalic and atraumatic. No abrasion or contusion. Comments: No signs of any head injury  Cardiovascular:      Rate and Rhythm: Normal rate and regular rhythm. Heart sounds: Normal heart sounds. No murmur heard. Pulmonary:      Effort: Pulmonary effort is normal. No respiratory distress. Breath sounds: Normal breath sounds. No wheezing. Chest:      Chest wall: Swelling and tenderness present. No crepitus. Abdominal:      General: There is no distension. Palpations: Abdomen is soft. Tenderness: There is no abdominal tenderness. There is no guarding. Musculoskeletal:         General: No deformity. Normal range of motion. Cervical back: Normal range of motion and neck supple. Skin:     General: Skin is warm and dry. Findings: Bruising and ecchymosis present. No rash. Neurological:      General: No focal deficit present. Mental Status: She is alert and oriented to person, place, and time.    Psychiatric:         Speech: Speech normal.         Behavior: Behavior normal.         Cognition and Memory: Cognition normal.       Diagnostic Study Results   Labs  Recent Results (from the past 24 hour(s))   BLOOD GAS,CHEM8,LACTIC ACID POC    Collection Time: 02/21/23  1:10 PM   Result Value Ref Range    pH, venous (POC) 7.33 7.32 - 7.42      pCO2, venous (POC) 52.5 (H) 41 - 51 MMHG    pO2, venous (POC) 34 25 - 40 mmHg    BICARBONATE 28 mmol/L    Base excess (POC) 0.9 mmol/L    O2 SAT 61 %    Sodium,  (H) 136 - 145 MMOL/L    Potassium, POC 3.6 3.5 - 5.5 MMOL/L    Chloride,  (H) 100 - 108 MMOL/L    CO2, POC 27 (H) 19 - 24 MMOL/L    Anion gap, POC 11 10 - 20      Glucose, POC 92 74 - 106 MG/DL    Creatinine, POC 0.9 0.6 - 1.3 MG/DL eGFR (POC) >60 >60 ml/min/1.73m2    Calcium, ionized (POC) 1.20 1. 12 - 1.32 mmol/L    Lactic Acid (POC) 0.78 0.40 - 2.00 mmol/L    Sample source VENOUS BLOOD      Critical value read back JASPAL        ==============================================================    Radiologic Studies  CT Results  (Last 48 hours)                 02/21/23 1357  CT ABD PELV W CONT Final result    Impression:  1. Right posterior ninth through 12th rib fractures. 2.  Otherwise no evidence of acute traumatic injury to the chest, abdomen, or   pelvis. 3.  No evidence of bowel obstruction. Narrative:  EXAM: CT CHEST W CONT, CT ABD PELV W CONT       INDICATION: fall, bruising and pain to right ribs, extensive colon distention on   Xray, eval for bowel obstruction       COMPARISON: 5/10/2022. IV CONTRAST: 100 mL of Isovue-370. ORAL CONTRAST: None       TECHNIQUE:    Following the uneventful intravenous administration of contrast, thin axial   images were obtained through the chest, abdomen and pelvis. Coronal and sagittal   reformats were generated. CT dose reduction was achieved through use of a   standardized protocol tailored for this examination and automatic exposure   control for dose modulation. FINDINGS:        CHEST WALL: No axillary or supraclavicular adenopathy. Right posterior lateral   chest wall subcutaneous fat stranding   THYROID: .   MEDIASTINUM: No mass or lymphadenopathy. LILA: No mass or lymphadenopathy. THORACIC AORTA: No dissection or aneurysm. MAIN PULMONARY ARTERY: Normal in caliber. TRACHEA/BRONCHI: Patent. ESOPHAGUS: Small hiatal hernia   HEART: Normal in size. PLEURA: No effusion or pneumothorax. LUNGS: No nodule, mass, or airspace disease. LIVER: No mass. BILIARY TREE: Gallbladder is within normal limits. CBD is not dilated. SPLEEN: within normal limits. PANCREAS: No mass or ductal dilatation. ADRENALS: Unremarkable.    KIDNEYS: No mass, calculus, or hydronephrosis. STOMACH: Unremarkable. SMALL BOWEL: No dilatation or wall thickening. COLON: No dilatation or wall thickening. Mild gaseous distention. APPENDIX: Unremarkable   PERITONEUM: No ascites or pneumoperitoneum. RETROPERITONEUM: No lymphadenopathy or aortic aneurysm. REPRODUCTIVE ORGANS:   URINARY BLADDER: No mass or calculus. BONES: Right posterior ninth through 12th rib fractures. ABDOMINAL WALL: No mass or hernia. ADDITIONAL COMMENTS: N/A           02/21/23 6077  CT CHEST W CONT Final result    Impression:  1. Right posterior ninth through 12th rib fractures. 2.  Otherwise no evidence of acute traumatic injury to the chest, abdomen, or   pelvis. 3.  No evidence of bowel obstruction. Narrative:  EXAM: CT CHEST W CONT, CT ABD PELV W CONT       INDICATION: fall, bruising and pain to right ribs, extensive colon distention on   Xray, eval for bowel obstruction       COMPARISON: 5/10/2022. IV CONTRAST: 100 mL of Isovue-370. ORAL CONTRAST: None       TECHNIQUE:    Following the uneventful intravenous administration of contrast, thin axial   images were obtained through the chest, abdomen and pelvis. Coronal and sagittal   reformats were generated. CT dose reduction was achieved through use of a   standardized protocol tailored for this examination and automatic exposure   control for dose modulation. FINDINGS:        CHEST WALL: No axillary or supraclavicular adenopathy. Right posterior lateral   chest wall subcutaneous fat stranding   THYROID: .   MEDIASTINUM: No mass or lymphadenopathy. LILA: No mass or lymphadenopathy. THORACIC AORTA: No dissection or aneurysm. MAIN PULMONARY ARTERY: Normal in caliber. TRACHEA/BRONCHI: Patent. ESOPHAGUS: Small hiatal hernia   HEART: Normal in size. PLEURA: No effusion or pneumothorax. LUNGS: No nodule, mass, or airspace disease. LIVER: No mass. BILIARY TREE: Gallbladder is within normal limits.  CBD is not dilated. SPLEEN: within normal limits. PANCREAS: No mass or ductal dilatation. ADRENALS: Unremarkable. KIDNEYS: No mass, calculus, or hydronephrosis. STOMACH: Unremarkable. SMALL BOWEL: No dilatation or wall thickening. COLON: No dilatation or wall thickening. Mild gaseous distention. APPENDIX: Unremarkable   PERITONEUM: No ascites or pneumoperitoneum. RETROPERITONEUM: No lymphadenopathy or aortic aneurysm. REPRODUCTIVE ORGANS:   URINARY BLADDER: No mass or calculus. BONES: Right posterior ninth through 12th rib fractures. ABDOMINAL WALL: No mass or hernia. ADDITIONAL COMMENTS: N/A                 CXR Results  (Last 48 hours)      None            Critical Care and Billable Procedures   EKG reviewed by ED Physician Manuela Bradshaw in the absence of a cardiologist: Yes  EKG below was independently interpreted by me Shelley Frazier MD)    Procedures    Medical Decision Making   I reviewed the patient's most recent Emergency Dept notes and diagnostic tests in formulating my MDM on today's visit. Medications administered during ED course:  Medications   iopamidoL (ISOVUE-370) 370 mg iodine /mL (76 %) injection 100 mL (100 mL IntraVENous Given 2/21/23 1356)   oxyCODONE-acetaminophen (PERCOCET) 5-325 mg per tablet 1 Tablet (1 Tablet Oral Given 2/21/23 1529)     Medical Decision Making // ED Course // Reassessment:  Zunilda Contreras, 65 y.o. female with history of congenital encephalopathy, MR, seizure disorder, brought to the emergency department by her caregiver for concerns for bruising and pain along her right side and flank. The patient was visiting her family members over the past week, reportedly had a fall 3 to 4 days ago where she fell backward against outdoor stairs. She has developed worsening pain and bruising, pain worsening with movement. Also having decreased appetite secondary to pain apparently. No nausea or vomiting. She is not anticoagulated.   She has not complained of any head injury or had any other changes in her mentation. Her pain seems to be moderate severity and sharp in nature with certain movements. Normal vital signs, afebrile. There are no signs of any head injury or C-spine injury by exam.  She is alert and oriented and in minimal apparent pain or distress by exam.    She has deep confluent bruising over the right lateral and anterior ribs and right flank, with moderate tenderness. No crepitus. No anterior abdominal tenderness. CT scan was obtained which demonstrates fractures in ribs 9 through 12, which is consistent with my exam, without any other significant internal injuries. No evidence of SBO or splenic or hepatic injury. Her caregiver thinks that she can go home with pain medication which I agree with. Rx Percocet, Zofran, MiraLAX. She was administered Percocet 5 mg p.o. with improvement. Radiology results independently interpreted by me: Chest x-ray demonstrates markedly dilated loops of colon, no air-fluid levels. No apparent rib fractures or pneumothorax    External Records reviewed: None    Consults:  None    Tests considered but not performed:     Differential Diagnoses ruled out: Pneumothorax, internal bleeding, splenic laceration, liver laceration,    Final Diagnosis:   1. Multiple rib fractures involving four or more ribs        Additional documentation if relevant for this encounter     Shared decision making:     Diagnosis and Disposition     Disposition:  Discharged    Final Diagnosis:   1. Multiple rib fractures involving four or more ribs        Current Discharge Medication List        START taking these medications    Details   oxyCODONE-acetaminophen (Percocet) 5-325 mg per tablet Take 1 Tablet by mouth every six (6) hours as needed for Pain for up to 7 days. Max Daily Amount: 4 Tablets.   Qty: 15 Tablet, Refills: 0  Start date: 2/21/2023, End date: 2/28/2023    Associated Diagnoses: Multiple rib fractures involving four or more ribs      polyethylene glycol (Miralax) 17 gram/dose powder Take 17 g by mouth daily for 14 days. Qty: 289 g, Refills: 0  Start date: 2/21/2023, End date: 3/7/2023      ondansetron (ZOFRAN ODT) 4 mg disintegrating tablet Take 1 Tablet by mouth every six (6) hours as needed for Nausea or Vomiting. Qty: 20 Tablet, Refills: 0  Start date: 2/21/2023              Follow up: Follow-up Information       Follow up With Specialties Details Why [address], [address], [address] Physician Assistant   [address] [address] [phone]            Disclaimers   I was the first provider for this patient on this visit. To the best of my ability I reviewed relevant prior medical records, electrocardiograms, laboratories, and radiologic studies. The patient's presenting problems were discussed, and the patient was in agreement with the care plan formulated and outlined with them. Please note that this dictation was completed with Dragon voice recognition software. Quite often unanticipated grammatical, syntax, homophones, and other interpretive errors are inadvertently transcribed by the computer software. Please disregard these errors and excuse any errors that have escaped final proofreading. Hossein Centeno MD    I personally performed the services described in this documentation on this date 2/21/2023 for patient Carola Mak.       Hossein Centeno MD  3:31 PM

## 2023-05-15 ENCOUNTER — OFFICE VISIT (OUTPATIENT)
Age: 63
End: 2023-05-15
Payer: MEDICARE

## 2023-05-15 VITALS
TEMPERATURE: 97.9 F | DIASTOLIC BLOOD PRESSURE: 62 MMHG | OXYGEN SATURATION: 96 % | BODY MASS INDEX: 26.08 KG/M2 | HEART RATE: 89 BPM | SYSTOLIC BLOOD PRESSURE: 116 MMHG | HEIGHT: 63 IN | WEIGHT: 147.2 LBS | RESPIRATION RATE: 16 BRPM

## 2023-05-15 DIAGNOSIS — R29.6 FALLS FREQUENTLY: ICD-10-CM

## 2023-05-15 DIAGNOSIS — R42 DIZZY SPELLS: ICD-10-CM

## 2023-05-15 DIAGNOSIS — Q07.9 CONGENITAL ENCEPHALOPATHY (HCC): ICD-10-CM

## 2023-05-15 DIAGNOSIS — G40.009 LOCALIZATION-RELATED (FOCAL) (PARTIAL) IDIOPATHIC EPILEPSY AND EPILEPTIC SYNDROMES WITH SEIZURES OF LOCALIZED ONSET, NOT INTRACTABLE, WITHOUT STATUS EPILEPTICUS (HCC): Primary | ICD-10-CM

## 2023-05-15 PROCEDURE — 1036F TOBACCO NON-USER: CPT | Performed by: PSYCHIATRY & NEUROLOGY

## 2023-05-15 PROCEDURE — 3017F COLORECTAL CA SCREEN DOC REV: CPT | Performed by: PSYCHIATRY & NEUROLOGY

## 2023-05-15 PROCEDURE — G8427 DOCREV CUR MEDS BY ELIG CLIN: HCPCS | Performed by: PSYCHIATRY & NEUROLOGY

## 2023-05-15 PROCEDURE — G8419 CALC BMI OUT NRM PARAM NOF/U: HCPCS | Performed by: PSYCHIATRY & NEUROLOGY

## 2023-05-15 PROCEDURE — 99214 OFFICE O/P EST MOD 30 MIN: CPT | Performed by: PSYCHIATRY & NEUROLOGY

## 2023-05-15 RX ORDER — PRIMIDONE 50 MG/1
TABLET ORAL
Qty: 180 TABLET | Refills: 3 | Status: SHIPPED | OUTPATIENT
Start: 2023-05-15

## 2023-05-15 RX ORDER — PHOSPHORATED CARBOHYDRATE 1.87; 21.5; 1.87 G/5ML; MG/5ML; G/5ML
SOLUTION ORAL
COMMUNITY
Start: 2021-09-17

## 2023-05-15 RX ORDER — CARBAMAZEPINE 200 MG
200 TABLET ORAL 3 TIMES DAILY
Qty: 90 TABLET | Refills: 3 | Status: SHIPPED | OUTPATIENT
Start: 2023-05-15 | End: 2023-05-16 | Stop reason: DRUGHIGH

## 2023-05-15 RX ORDER — PRIMIDONE 250 MG/1
TABLET ORAL
Qty: 270 TABLET | Refills: 3 | Status: SHIPPED | OUTPATIENT
Start: 2023-05-15

## 2023-05-15 RX ORDER — DIAPER,BRIEF,INFANT-TODD,DISP
EACH MISCELLANEOUS
COMMUNITY
Start: 2023-05-01

## 2023-05-15 ASSESSMENT — PATIENT HEALTH QUESTIONNAIRE - PHQ9
1. LITTLE INTEREST OR PLEASURE IN DOING THINGS: 1
SUM OF ALL RESPONSES TO PHQ QUESTIONS 1-9: 2
SUM OF ALL RESPONSES TO PHQ QUESTIONS 1-9: 2
SUM OF ALL RESPONSES TO PHQ9 QUESTIONS 1 & 2: 2
2. FEELING DOWN, DEPRESSED OR HOPELESS: 1
SUM OF ALL RESPONSES TO PHQ QUESTIONS 1-9: 2
SUM OF ALL RESPONSES TO PHQ QUESTIONS 1-9: 2

## 2023-05-15 NOTE — PROGRESS NOTES
[facility]   In Office FOLLOW-UP VISIT           Efren Duffy is a 59 y.o.  female who presents today for the following:  Chief Complaint   Patient presents with    Follow-up     Patient is present for follow up on epilepsy and has not had any seizures since last visit. ASSESSMENT AND PLAN    1. Localization-related (focal) (partial) idiopathic epilepsy and epileptic syndromes with seizures of localized onset, not intractable, without status epilepticus ([facility] [address]  Assessment & Plan:   Well-controlled continue on brand-name Tegretol 200 mg 2 tablets 3 times a day along with primidone 250 mg 3 times a day and primidone 50 mg twice a day    We have tried to wean her medications in the past and found that she had breakthrough seizure flurries in addition when she is not on Tegretol brand-name only she also has breakthrough seizures  Orders:  -     primidone (MYSOLINE) 250 MG tablet; 1 TABLET BY MOUTH 3 TIMES A DAY  Indications: convulsive seizures, Disp-270 tablet, R-3Normal  -     primidone (MYSOLINE) 50 MG tablet; 1 TABLET BY MOUTH 2 TIMES A DAY FOR EPILEPSY, Disp-180 tablet, R-3Normal  -     TEGRETOL 200 MG tablet; Take 2 tablets by mouth 3 times daily, Disp-540 tablet, R-3, DAWNormal  2. Congenital encephalopathy ([facility] [address]  Assessment & Plan:   Functionally remains stable and unchanged and is easily able to make her needs and concerns now  She is supervised by community services  3. Falls frequently  Assessment & Plan:   Historically we have reviewed fall precautions  Did improve after physical therapy and a slight reduction in medications but could not tolerate further reduction in medications secondary to breakthrough seizures  4. Dizzy spells  Assessment & Plan:   Seemingly resolved at this time            Patient and/or family was given time to ask questions and voice concerns. I believe all questions concerns were adequately addressed at this  office visit.   Patient

## 2023-05-15 NOTE — PATIENT INSTRUCTIONS
As per discussion overall you are doing well I did renew all your medications with a 90-day supply and refills x1 year and I made sure to sera the Tegretol as brand-name only. We will see you back in 6 months for your follow-up continue all your activity as tolerated    And and glad to see you have lost some weight but make sure you are eating healthy regarding that weight loss        Office Policies    Phone calls/patient messages:  Please allow up to 24 hours for someone in the office to contact you about your call or message. Be mindful your provider may be out of the office or your message may require further review. We encourage you to use [employer] for your messages as this is a faster, more efficient way to communicate with our office    Medication Refills:  Prescription medications require up to 48 business hours to process. We encourage you to use [employer] for your refills. For controlled medications: Please allow up to 72 business hours to process. Certain medications may require you to  a written prescription at our office. NO narcotic/controlled medications will be prescribed after 4pm Monday through Friday or on weekends    Form/Paperwork Completion:  We ask that you allow 7-14 business days. You may also download your forms to [employer] to have your doctor print off.

## 2023-05-15 NOTE — ASSESSMENT & PLAN NOTE
Historically we have reviewed fall precautions  Did improve after physical therapy and a slight reduction in medications but could not tolerate further reduction in medications secondary to breakthrough seizures

## 2023-05-15 NOTE — ASSESSMENT & PLAN NOTE
Functionally remains stable and unchanged and is easily able to make her needs and concerns now  She is supervised by community services

## 2023-05-15 NOTE — ASSESSMENT & PLAN NOTE
Well-controlled continue on brand-name Tegretol 200 mg 2 tablets 3 times a day along with primidone 250 mg 3 times a day and primidone 50 mg twice a day    We have tried to wean her medications in the past and found that she had breakthrough seizure flurries in addition when she is not on Tegretol brand-name only she also has breakthrough seizures

## 2023-05-16 ENCOUNTER — TELEPHONE (OUTPATIENT)
Age: 63
End: 2023-05-16

## 2023-05-16 RX ORDER — CARBAMAZEPINE 200 MG
400 TABLET ORAL 3 TIMES DAILY
Qty: 540 TABLET | Refills: 3 | Status: SHIPPED | OUTPATIENT
Start: 2023-05-16

## 2023-05-16 NOTE — TELEPHONE ENCOUNTER
Spoke with Beckie Vasquez and patient is to take 2 tabs 3 x a day. Beckie Vasquez is sending in new order. Spoke to Juanito and advised of clarification and that Beckie Vasquez will send in a new script.

## 2023-05-16 NOTE — TELEPHONE ENCOUNTER
Clotilde Bergman called stating that in Pt's AVS it says to continue taking the Tegretol as 2 pills 3 times a day but another part says to take 1 pill three times a day. Clotilde Bergman would like a call back for clarification. Please advise.    [phone]

## 2023-05-31 ENCOUNTER — HOSPITAL ENCOUNTER (OUTPATIENT)
Facility: HOSPITAL | Age: 63
Discharge: HOME | End: 2023-06-03
Payer: MEDICARE

## 2023-05-31 DIAGNOSIS — R94.5 NONSPECIFIC ABNORMAL RESULTS OF LIVER FUNCTION STUDY: ICD-10-CM

## 2023-05-31 PROCEDURE — 76700 US EXAM ABDOM COMPLETE: CPT

## 2023-06-13 ENCOUNTER — OFFICE VISIT (OUTPATIENT)
Age: 63
End: 2023-06-13
Payer: MEDICARE

## 2023-06-13 VITALS
TEMPERATURE: 97.3 F | HEIGHT: 63 IN | OXYGEN SATURATION: 98 % | RESPIRATION RATE: 16 BRPM | BODY MASS INDEX: 25.04 KG/M2 | SYSTOLIC BLOOD PRESSURE: 139 MMHG | DIASTOLIC BLOOD PRESSURE: 84 MMHG | HEART RATE: 93 BPM | WEIGHT: 141.3 LBS

## 2023-06-13 DIAGNOSIS — R10.84 GENERALIZED ABDOMINAL PAIN: Primary | ICD-10-CM

## 2023-06-13 PROCEDURE — 99204 OFFICE O/P NEW MOD 45 MIN: CPT | Performed by: SURGERY

## 2023-06-13 PROCEDURE — 3017F COLORECTAL CA SCREEN DOC REV: CPT | Performed by: SURGERY

## 2023-06-13 PROCEDURE — G8419 CALC BMI OUT NRM PARAM NOF/U: HCPCS | Performed by: SURGERY

## 2023-06-13 PROCEDURE — G8427 DOCREV CUR MEDS BY ELIG CLIN: HCPCS | Performed by: SURGERY

## 2023-06-13 PROCEDURE — 1036F TOBACCO NON-USER: CPT | Performed by: SURGERY

## 2023-06-13 RX ORDER — DEXTROMETHORPHAN HYDROBROMIDE AND PROMETHAZINE HYDROCHLORIDE 15; 6.25 MG/5ML; MG/5ML
5 SYRUP ORAL 4 TIMES DAILY PRN
COMMUNITY

## 2023-06-13 ASSESSMENT — PATIENT HEALTH QUESTIONNAIRE - PHQ9
SUM OF ALL RESPONSES TO PHQ QUESTIONS 1-9: 0
SUM OF ALL RESPONSES TO PHQ QUESTIONS 1-9: 0
2. FEELING DOWN, DEPRESSED OR HOPELESS: 0
SUM OF ALL RESPONSES TO PHQ9 QUESTIONS 1 & 2: 0
SUM OF ALL RESPONSES TO PHQ QUESTIONS 1-9: 0
SUM OF ALL RESPONSES TO PHQ QUESTIONS 1-9: 0
1. LITTLE INTEREST OR PLEASURE IN DOING THINGS: 0

## 2023-06-16 ENCOUNTER — TELEPHONE (OUTPATIENT)
Age: 63
End: 2023-06-16

## 2023-06-20 ENCOUNTER — TELEPHONE (OUTPATIENT)
Age: 63
End: 2023-06-20

## 2023-06-20 NOTE — TELEPHONE ENCOUNTER
Patient's caregiver is requesting a call back to schedule and ANS appointment for patient. Was referred by Jocelyn Fischer NP. Please contact.

## 2023-06-27 ENCOUNTER — HOSPITAL ENCOUNTER (OUTPATIENT)
Facility: HOSPITAL | Age: 63
Discharge: HOME | End: 2023-06-30
Attending: SURGERY
Payer: MEDICARE

## 2023-06-27 DIAGNOSIS — R10.84 GENERALIZED ABDOMINAL PAIN: ICD-10-CM

## 2023-06-27 PROCEDURE — 74177 CT ABD & PELVIS W/CONTRAST: CPT

## 2023-06-27 PROCEDURE — 6360000004 HC RX CONTRAST MEDICATION: Performed by: PHYSICIAN ASSISTANT

## 2023-06-27 RX ADMIN — IOPAMIDOL 100 ML: 755 INJECTION, SOLUTION INTRAVENOUS at 14:20

## 2023-06-28 ENCOUNTER — TELEPHONE (OUTPATIENT)
Age: 63
End: 2023-06-28

## 2023-07-05 ENCOUNTER — TELEPHONE (OUTPATIENT)
Age: 63
End: 2023-07-05

## 2023-07-05 NOTE — TELEPHONE ENCOUNTER
Spoke with Anjana Lenz HIPAA, caregiver @ [facility]  re scheduling patient surgery, stated @ this time patient has refused surgery.

## 2023-07-06 ENCOUNTER — PROCEDURE VISIT (OUTPATIENT)
Age: 63
End: 2023-07-06

## 2023-07-06 DIAGNOSIS — R42 DIZZY SPELLS: Primary | ICD-10-CM

## 2023-07-07 ENCOUNTER — TELEPHONE (OUTPATIENT)
Age: 63
End: 2023-07-07

## 2023-07-07 NOTE — PROGRESS NOTES
interpretation. There is a sustained reduction of blood pressure (>30/15 mmHg) with increased heart rate response, which can be seen in non-neurogenic cause of orthostatic hypotension ( i.e hypovolemia/ deconditioning). 3.  Reduced sweat production in all 4 localities (forearm, proximal leg, distal leg, foot) which can be seen in small fiber polyneuropathy or medication effect (I.e Hydroxyzine).         Evelyn Moe MD  Diplomate, American Board of Psychiatry and Neurology  Diplomate, Neuromuscular Medicine  Diplomate, American Board of Electrodiagnostic Medicine    Note: Raw Data will be scanned separately in Media

## 2023-07-14 NOTE — TELEPHONE ENCOUNTER
Spoke with Maren Ta who is on the hippa form and advised that Nigel Coleman would like for patient to have a evisit. Gave instructions on how to start that process after she has set up the my chart access. Advised of the results and that the care giver that attends to patient is not on the hippa form and we would not be talking to Anupam Arredondo about any information of the patient.    Maren Ta gave relief to hear that we would not be providing info to the Caregiver deepak

## 2023-07-14 NOTE — TELEPHONE ENCOUNTER
Patient's sister, Marchia Canavan, called for results. Advised her what Estrella Dawson stated and sent her a link to set up the [address] [place] [address]. Requested call back from Nurse with some questions.  [phone]

## 2023-12-01 ENCOUNTER — OFFICE VISIT (OUTPATIENT)
Age: 63
End: 2023-12-01
Payer: MEDICARE

## 2023-12-01 VITALS
BODY MASS INDEX: 24.15 KG/M2 | SYSTOLIC BLOOD PRESSURE: 142 MMHG | RESPIRATION RATE: 18 BRPM | OXYGEN SATURATION: 99 % | DIASTOLIC BLOOD PRESSURE: 83 MMHG | WEIGHT: 136.3 LBS | TEMPERATURE: 97.2 F | HEIGHT: 63 IN | HEART RATE: 86 BPM

## 2023-12-01 DIAGNOSIS — R29.6 FALLS FREQUENTLY: ICD-10-CM

## 2023-12-01 DIAGNOSIS — G40.009 LOCALIZATION-RELATED (FOCAL) (PARTIAL) IDIOPATHIC EPILEPSY AND EPILEPTIC SYNDROMES WITH SEIZURES OF LOCALIZED ONSET, NOT INTRACTABLE, WITHOUT STATUS EPILEPTICUS (HCC): Primary | ICD-10-CM

## 2023-12-01 PROCEDURE — 1036F TOBACCO NON-USER: CPT | Performed by: PSYCHIATRY & NEUROLOGY

## 2023-12-01 PROCEDURE — G8420 CALC BMI NORM PARAMETERS: HCPCS | Performed by: PSYCHIATRY & NEUROLOGY

## 2023-12-01 PROCEDURE — G8484 FLU IMMUNIZE NO ADMIN: HCPCS | Performed by: PSYCHIATRY & NEUROLOGY

## 2023-12-01 PROCEDURE — 3017F COLORECTAL CA SCREEN DOC REV: CPT | Performed by: PSYCHIATRY & NEUROLOGY

## 2023-12-01 PROCEDURE — G8427 DOCREV CUR MEDS BY ELIG CLIN: HCPCS | Performed by: PSYCHIATRY & NEUROLOGY

## 2023-12-01 PROCEDURE — 99214 OFFICE O/P EST MOD 30 MIN: CPT | Performed by: PSYCHIATRY & NEUROLOGY

## 2023-12-01 ASSESSMENT — PATIENT HEALTH QUESTIONNAIRE - PHQ9
SUM OF ALL RESPONSES TO PHQ QUESTIONS 1-9: 0
SUM OF ALL RESPONSES TO PHQ QUESTIONS 1-9: 0
2. FEELING DOWN, DEPRESSED OR HOPELESS: 0
SUM OF ALL RESPONSES TO PHQ QUESTIONS 1-9: 0
SUM OF ALL RESPONSES TO PHQ9 QUESTIONS 1 & 2: 0
SUM OF ALL RESPONSES TO PHQ QUESTIONS 1-9: 0
1. LITTLE INTEREST OR PLEASURE IN DOING THINGS: 0

## 2023-12-01 NOTE — ASSESSMENT & PLAN NOTE
ANS testing supportive of orthostatic hypotension. Patient did have 1 fall shortly after the testing which resulted in a displaced fracture in her hand. That is now healed.   She has been staying well-hydrated and has had no further issues related to falls

## 2023-12-01 NOTE — ASSESSMENT & PLAN NOTE
Stable and doing well  Continue on Tegretol 200 mg 2 tablets 3 times a day brand-name only along with primidone 250 mg 3 times a day and primidone 50 mg twice daily    In the past we have tried to wean the medication especially the primidone and found that she had breakthrough seizure flurries so she was put back up to baseline dosing

## 2023-12-01 NOTE — PROGRESS NOTES
[address]   In Office FOLLOW-UP VISIT           Rissa Hernandez is a 59 y.o.  female who presents today for the following:  Chief Complaint   Patient presents with    Follow-up     Pt states she is fine and has no further updates        ASSESSMENT AND PLAN    1. Localization-related (focal) (partial) idiopathic epilepsy and epileptic syndromes with seizures of localized onset, not intractable, without status epilepticus ([address])  Assessment & Plan:    Stable and doing well  Continue on Tegretol 200 mg 2 tablets 3 times a day brand-name only along with primidone 250 mg 3 times a day and primidone 50 mg twice daily    In the past we have tried to wean the medication especially the primidone and found that she had breakthrough seizure flurries so she was put back up to baseline dosing  2. Falls frequently  Assessment & Plan:  ANS testing supportive of orthostatic hypotension. Patient did have 1 fall shortly after the testing which resulted in a displaced fracture in her hand. That is now healed. She has been staying well-hydrated and has had no further issues related to falls      Routine paperwork was filled out for the facility      Return in about 6 months (around 6/1/2024) for In office. Patient and/or family was given time to ask questions and voice concerns. I believe all questions concerns were adequately addressed at this  office visit. Patient and/or family also verbalized agreement and understanding of the above-stated plan    Patient remains a complex patient secondary to polypharmacy, significant comorbid conditions, and use of high-risk medications which complicate the decision making process related to patient's neurologic diagnosis      ICD-10-CM    1. Localization-related (focal) (partial) idiopathic epilepsy and epileptic syndromes with seizures of localized onset, not intractable, without status epilepticus ([address])  G40.009       2.  Falls frequently  R29.6

## 2023-12-01 NOTE — PATIENT INSTRUCTIONS
As per discussion  Overall you are doing well I would not recommend any changes in treatment or interventions at this time    The ANS testing that you have done does show that your blood pressure is dropping due to not drinking enough water and staying well-hydrated which was causing your falls so please continue to make sure you stay well-hydrated    No prescriptions are due at this time just have your pharmacy send us a refill request when they are due    Wishing you a very Merry Altagracia and a Happy New Tam Hol[place]    Phone calls/patient messages:  Please allow up to 24 hours for someone in the office to contact you about your call or message. Be mindful your provider may be out of the office or your message may require further review. We encourage you to use [employer] for your messages as this is a faster, more efficient way to communicate with our office    Medication Refills:  Prescription medications require up to 48 business hours to process. We encourage you to use [employer] for your refills. For controlled medications: Please allow up to 72 business hours to process. Certain medications may require you to  a written prescription at our office. NO narcotic/controlled medications will be prescribed after 4pm Monday through Friday or on weekends    Form/Paperwork Completion:  We ask that you allow 7-14 business days. You may also download your forms to [employer] to have your doctor print off.

## 2024-01-23 ENCOUNTER — CLINICAL DOCUMENTATION (OUTPATIENT)
Age: 64
End: 2024-01-23

## 2024-01-23 NOTE — PROGRESS NOTES
Lisbeth's response to [facility] has been faxed to them with no medication changes due to labs done with that office. Confirmation and labs with response is sent to  to scan into chart.

## 2024-03-15 ENCOUNTER — TELEPHONE (OUTPATIENT)
Age: 64
End: 2024-03-15

## 2024-03-15 NOTE — TELEPHONE ENCOUNTER
Pharmacist called on the behalf of patient checking the status of PA on medication TEGRETOL 200 MG tablet     Please advise

## 2024-03-25 ENCOUNTER — TELEPHONE (OUTPATIENT)
Age: 64
End: 2024-03-25

## 2024-03-25 NOTE — TELEPHONE ENCOUNTER
Jackson white/ [facility] pharmacy called stating that he is still trying to figure out the status of the PA for Pt's Tegretol. Requested a response asap as he states this is his 5th time reaching out. Please advise. [phone]

## 2024-03-25 NOTE — TELEPHONE ENCOUNTER
UPDATE: Tegretol approved  1/1/24 - 3/25/25   Right Fax sent to Rui at [facility]    Member ID [id number] Tegretol - PA requesting brand only - to expedite    Sent to [employer]    Key: [id number]    Your information has been submitted to [employer]. [facility] [place] will review the request and will issue a decision, typically within 1-3 days from your submission. You can check the updated outcome later by reopening this request.  If [employer] has not responded in 1-3 days or if you have any questions about your ePA request, please contact [employer] at [phone].    [employer] does not allow to submit request for more than 30 days.     I submitted 180 per 30    Lisbeth did write it for a 90 day supply.      Once approved, will need to be revised for 30 day quantity per month

## 2024-04-15 ENCOUNTER — TELEPHONE (OUTPATIENT)
Age: 64
End: 2024-04-15

## 2024-04-15 NOTE — TELEPHONE ENCOUNTER
Reviewed Tegretol approval - from my documentation on 3/25/24.     [facility] had shown they can fill for 30 days at a time, not 90 days.     There are no further calls or messages about the dosage that Lisbeth wrote.     Perhaps patient is able to get the higher quantity.     Jihan,   Would you mind confirming what she was able to fill and if it needs revising?     Thanks!

## 2024-06-25 ENCOUNTER — OFFICE VISIT (OUTPATIENT)
Age: 64
End: 2024-06-25
Payer: MEDICARE

## 2024-06-25 VITALS
WEIGHT: 142 LBS | HEART RATE: 88 BPM | BODY MASS INDEX: 25.15 KG/M2 | DIASTOLIC BLOOD PRESSURE: 82 MMHG | OXYGEN SATURATION: 96 % | SYSTOLIC BLOOD PRESSURE: 112 MMHG

## 2024-06-25 DIAGNOSIS — R29.6 FALLS FREQUENTLY: ICD-10-CM

## 2024-06-25 DIAGNOSIS — G40.009 LOCALIZATION-RELATED (FOCAL) (PARTIAL) IDIOPATHIC EPILEPSY AND EPILEPTIC SYNDROMES WITH SEIZURES OF LOCALIZED ONSET, NOT INTRACTABLE, WITHOUT STATUS EPILEPTICUS (HCC): ICD-10-CM

## 2024-06-25 DIAGNOSIS — G40.009 LOCALIZATION-RELATED (FOCAL) (PARTIAL) IDIOPATHIC EPILEPSY AND EPILEPTIC SYNDROMES WITH SEIZURES OF LOCALIZED ONSET, NOT INTRACTABLE, WITHOUT STATUS EPILEPTICUS (HCC): Primary | ICD-10-CM

## 2024-06-25 DIAGNOSIS — R42 DIZZY SPELLS: ICD-10-CM

## 2024-06-25 DIAGNOSIS — Z79.899 HIGH RISK MEDICATIONS (NOT ANTICOAGULANTS) LONG-TERM USE: ICD-10-CM

## 2024-06-25 PROCEDURE — 1036F TOBACCO NON-USER: CPT | Performed by: PSYCHIATRY & NEUROLOGY

## 2024-06-25 PROCEDURE — 99215 OFFICE O/P EST HI 40 MIN: CPT | Performed by: PSYCHIATRY & NEUROLOGY

## 2024-06-25 PROCEDURE — G8427 DOCREV CUR MEDS BY ELIG CLIN: HCPCS | Performed by: PSYCHIATRY & NEUROLOGY

## 2024-06-25 PROCEDURE — 3017F COLORECTAL CA SCREEN DOC REV: CPT | Performed by: PSYCHIATRY & NEUROLOGY

## 2024-06-25 PROCEDURE — G8419 CALC BMI OUT NRM PARAM NOF/U: HCPCS | Performed by: PSYCHIATRY & NEUROLOGY

## 2024-06-25 RX ORDER — PRIMIDONE 50 MG/1
TABLET ORAL
Qty: 180 TABLET | Refills: 3 | Status: SHIPPED | OUTPATIENT
Start: 2024-06-25

## 2024-06-25 RX ORDER — CARBAMAZEPINE 200 MG
400 TABLET ORAL 3 TIMES DAILY
Qty: 540 TABLET | Refills: 3 | Status: SHIPPED | OUTPATIENT
Start: 2024-06-25

## 2024-06-25 RX ORDER — PRIMIDONE 250 MG/1
TABLET ORAL
Qty: 270 TABLET | Refills: 3 | Status: SHIPPED | OUTPATIENT
Start: 2024-06-25

## 2024-06-25 ASSESSMENT — PATIENT HEALTH QUESTIONNAIRE - PHQ9
SUM OF ALL RESPONSES TO PHQ QUESTIONS 1-9: 0
SUM OF ALL RESPONSES TO PHQ QUESTIONS 1-9: 0
SUM OF ALL RESPONSES TO PHQ9 QUESTIONS 1 & 2: 0
SUM OF ALL RESPONSES TO PHQ QUESTIONS 1-9: 0
SUM OF ALL RESPONSES TO PHQ QUESTIONS 1-9: 0
2. FEELING DOWN, DEPRESSED OR HOPELESS: NOT AT ALL
1. LITTLE INTEREST OR PLEASURE IN DOING THINGS: NOT AT ALL

## 2024-06-25 NOTE — PROGRESS NOTES
No seizures   Handles med well      
Primidone level was 5.4 and phenobarbital derivative was 9.  Carbamazepine level was 8.6.    Primidone 50 mg twice daily was added to her regimen.       Patient was seen on follow-up 9/7/2018 and note mentions patient doing well since primidone was increased.         Patient still having breakthrough seizures several times a month which composed of contortion of the upper body with arms rising and head turning and making noises. No loss of consciousness and it does not generalized.      Rubs hands together or stand still w starring lasts several mins and is postictal for a bit \"makes her tired\" and she knows everything going on around her      Plan was to continue her current regiment of 300 mg of primidone 3 times a day and carbamazepine 400 mg twice daily.    Primidone level was slightly elevated at 12.2 and phenobarbital level was 30.       Patient was then seen by Dr. Goodman 5/3/2019 and note mentions patient having no breakthrough seizures since the last visit.  Exam noted some unsteadiness to her gait and nystagmus on lateral gaze.  Labs done revealed elevated carbamazepine level at 13, elevated primidone level at 19.2 and phenobarbital level of 31.  Patient was told to decrease her 50 mg primidone to twice daily instead of 3 times daily.     She can still have spells at most 5-6 and a month.  She continues to take carbamazepine 200 mg, 2 tabs  3 times a day and primidone 250 mg 3 times daily plus 50 mg twice daily.  Denies any side effects.     Interim Data:   Here with Fabby  With the patient today included: Rashad hawkins's care giver through the [facility] system  She is  living in a residential facility  [Jeff: , work with [facility]; professional support   She to started in April, 2022]        Seizure disorder  Current AEDs  Mysoline 250 mg 1 tablet 3 times a day  Mysoline 50 mg 1 tablet twice a day [will be tried to reduce it down to once a day patient had significant bout of

## 2024-06-25 NOTE — PATIENT INSTRUCTIONS
As a reminder:   Please come to your appointment 15 minutes before your office appointment.  This way, you can get checked in at the  and checked in by the nursing staff so you have the full allotment of time with your provider for your visit.  Please bring an up-to-date and accurate list of all your medications.  Or bring all your active prescription bottles with you at the time of your office visit and this includes over-the-counter medications so we can make sure that your medication list is up-to-date.  If you are scheduled for a virtual visit, please be aware that the  will need to check you in and usually the day before to verify insurance and collect co-pays as appropriate.  Please be prepared for the second call which will be from the nurse to go over your medications and any other vital information.  This will probably be done 30 minutes prior to your visit.  The reason why we do this early is that you can get the full benefit of your appointment time with your provider.  Finally you will be given the link for your virtual visit please click into your link 10 minutes prior to your appointment and please wait patiently for the provider to join you        As per discussion  Overall you are stable from a neurologic perspective.  Your seizure medicines have all been renewed with refills times a year    I want you to work on increasing your water intake to at least 6 glasses a day have 1 glass at breakfast lunch and dinner and 1 glass in between each that we will get you to 6 glasses of water per day.    We also need to do some activity for 30 minutes each day whether that means going for a walk, reading, doing a puzzle or coloring but we need to get your brain and your body a little bit more active                      Office Policies    Phone calls/patient messages:  Please allow up to 72 hours  for someone in the office to contact you about your call or message. Be mindful your provider

## 2024-06-25 NOTE — ASSESSMENT & PLAN NOTE
No falls since last office visit encourage patient to make sure she exercises and takes an adequate amount of free water on a daily basis

## 2024-06-26 LAB
ALBUMIN SERPL-MCNC: 3.3 G/DL (ref 3.5–5)
ALBUMIN/GLOB SERPL: 0.7 (ref 1.1–2.2)
ALP SERPL-CCNC: 168 U/L (ref 45–117)
ALT SERPL-CCNC: 41 U/L (ref 12–78)
ANION GAP SERPL CALC-SCNC: 4 MMOL/L (ref 5–15)
AST SERPL-CCNC: 36 U/L (ref 15–37)
BASOPHILS # BLD: 0 K/UL (ref 0–0.1)
BASOPHILS NFR BLD: 1 % (ref 0–1)
BILIRUB SERPL-MCNC: 0.2 MG/DL (ref 0.2–1)
BUN SERPL-MCNC: 15 MG/DL (ref 6–20)
BUN/CREAT SERPL: 18 (ref 12–20)
CALCIUM SERPL-MCNC: 8.9 MG/DL (ref 8.5–10.1)
CARBAMAZEPINE SERPL-MCNC: 15.4 UG/ML (ref 4–12)
CHLORIDE SERPL-SCNC: 110 MMOL/L (ref 97–108)
CO2 SERPL-SCNC: 26 MMOL/L (ref 21–32)
CREAT SERPL-MCNC: 0.85 MG/DL (ref 0.55–1.02)
DIFFERENTIAL METHOD BLD: ABNORMAL
EOSINOPHIL # BLD: 0.2 K/UL (ref 0–0.4)
EOSINOPHIL NFR BLD: 4 % (ref 0–7)
ERYTHROCYTE [DISTWIDTH] IN BLOOD BY AUTOMATED COUNT: 13.2 % (ref 11.5–14.5)
GLOBULIN SER CALC-MCNC: 4.8 G/DL (ref 2–4)
GLUCOSE SERPL-MCNC: 88 MG/DL (ref 65–100)
HCT VFR BLD AUTO: 41.9 % (ref 35–47)
HGB BLD-MCNC: 13.5 G/DL (ref 11.5–16)
IMM GRANULOCYTES # BLD AUTO: 0 K/UL (ref 0–0.04)
IMM GRANULOCYTES NFR BLD AUTO: 0 % (ref 0–0.5)
LYMPHOCYTES # BLD: 1.5 K/UL (ref 0.8–3.5)
LYMPHOCYTES NFR BLD: 29 % (ref 12–49)
MCH RBC QN AUTO: 32.3 PG (ref 26–34)
MCHC RBC AUTO-ENTMCNC: 32.2 G/DL (ref 30–36.5)
MCV RBC AUTO: 100.2 FL (ref 80–99)
MONOCYTES # BLD: 0.4 K/UL (ref 0–1)
MONOCYTES NFR BLD: 7 % (ref 5–13)
NEUTS SEG # BLD: 3.2 K/UL (ref 1.8–8)
NEUTS SEG NFR BLD: 60 % (ref 32–75)
NRBC # BLD: 0 K/UL (ref 0–0.01)
NRBC BLD-RTO: 0 PER 100 WBC
PLATELET # BLD AUTO: 200 K/UL (ref 150–400)
POTASSIUM SERPL-SCNC: 4.8 MMOL/L (ref 3.5–5.1)
PROT SERPL-MCNC: 8.1 G/DL (ref 6.4–8.2)
RBC # BLD AUTO: 4.18 M/UL (ref 3.8–5.2)
SODIUM SERPL-SCNC: 140 MMOL/L (ref 136–145)
WBC # BLD AUTO: 5.3 K/UL (ref 3.6–11)

## 2025-01-07 ENCOUNTER — TELEMEDICINE (OUTPATIENT)
Age: 65
End: 2025-01-07
Payer: MEDICARE

## 2025-01-07 DIAGNOSIS — K11.7 DROOLING: ICD-10-CM

## 2025-01-07 DIAGNOSIS — G40.009 LOCALIZATION-RELATED (FOCAL) (PARTIAL) IDIOPATHIC EPILEPSY AND EPILEPTIC SYNDROMES WITH SEIZURES OF LOCALIZED ONSET, NOT INTRACTABLE, WITHOUT STATUS EPILEPTICUS (HCC): Primary | ICD-10-CM

## 2025-01-07 DIAGNOSIS — R29.6 FALLS FREQUENTLY: ICD-10-CM

## 2025-01-07 DIAGNOSIS — R42 DIZZY SPELLS: ICD-10-CM

## 2025-01-07 PROCEDURE — G8427 DOCREV CUR MEDS BY ELIG CLIN: HCPCS | Performed by: NURSE PRACTITIONER

## 2025-01-07 PROCEDURE — 3017F COLORECTAL CA SCREEN DOC REV: CPT | Performed by: NURSE PRACTITIONER

## 2025-01-07 PROCEDURE — 99214 OFFICE O/P EST MOD 30 MIN: CPT | Performed by: NURSE PRACTITIONER

## 2025-01-07 PROCEDURE — 1036F TOBACCO NON-USER: CPT | Performed by: NURSE PRACTITIONER

## 2025-01-07 PROCEDURE — G8419 CALC BMI OUT NRM PARAM NOF/U: HCPCS | Performed by: NURSE PRACTITIONER

## 2025-01-07 RX ORDER — PRIMIDONE 50 MG/1
TABLET ORAL
Qty: 180 TABLET | Refills: 3 | Status: SHIPPED | OUTPATIENT
Start: 2025-01-07

## 2025-01-07 RX ORDER — GLYCOPYRROLATE 1 MG/1
1 TABLET ORAL DAILY
Qty: 30 TABLET | Refills: 3 | Status: SHIPPED | OUTPATIENT
Start: 2025-01-07

## 2025-01-07 RX ORDER — CARBAMAZEPINE 200 MG
400 TABLET ORAL 3 TIMES DAILY
Qty: 540 TABLET | Refills: 3 | Status: SHIPPED | OUTPATIENT
Start: 2025-01-07

## 2025-01-07 RX ORDER — PRIMIDONE 250 MG/1
TABLET ORAL
Qty: 270 TABLET | Refills: 3 | Status: SHIPPED | OUTPATIENT
Start: 2025-01-07

## 2025-01-07 NOTE — PROGRESS NOTES
Neurology Note    Patient ID:  Rcoio Lamar  [id number]  64 y.o.  1960      Date of Consultation:  January 7, 2025        Reason for Consultation:  seizure follow up    This is a telemedicine visit that was performed with in the originating site at patient's home and the distance site at [facility].  This telemedicine visit utilized synchronous (real-time) audio-video technology.  Verbal consent to participate in the video visit was obtained.  This visit occurred during the corona (COVID -19) public health emergency.  I discussed with the patient the nature of our telemedicine visit, that:  - I would evaluate the patient and recommend diagnostic and treatment based on my assessment  - Our sessions are not being recorded and that personal health information is protected  - Our team will provide follow-up care in person if and when the patient needs it.      Rocio Lamar, was evaluated through a synchronous (real-time) audio-video encounter. The patient (or guardian if applicable) is aware that this is a billable service, which includes applicable co-pays. This Virtual Visit was conducted with patient's (and/or legal guardian's) consent. Patient identification was verified, and a caregiver was present when appropriate.   The patient was located at Home: [address] [place] [address] [address]  [facility] [place] [address]  Provider was located at Home (Appt Dept State): [place]  Confirm you are appropriately licensed, registered, or certified to deliver care in the state where the patient is located as indicated above. If you are not or unsure, please re-schedule the visit: Yes, I confirm.      Total time spent for this encounter: Not billed by time    --abdifatah Gandhi APRN - NP on 1/7/2025 at 10:54 AM    An electronic signature was used to authenticate this note.   Consent:  The patient is aware that this patient-initiated Telehealth encounter is a billable service, with

## 2025-01-28 ENCOUNTER — TELEPHONE (OUTPATIENT)
Age: 65
End: 2025-01-28

## 2025-01-28 NOTE — TELEPHONE ENCOUNTER
Patient caregiver would like the lab orders sent to [facility]. She did not provide a fax number. She would like it sent to the [facility] on [address] , [address], [place], [place]. Thank you.

## 2025-01-28 NOTE — TELEPHONE ENCOUNTER
Lab Orders faxed to [facility] on [address], [address], [place], [place] at # [phone]. Received confirmation that fax went through successfully

## 2025-03-31 DIAGNOSIS — K11.7 DROOLING: ICD-10-CM

## 2025-04-03 RX ORDER — GLYCOPYRROLATE 1 MG/1
1 TABLET ORAL DAILY
Qty: 31 TABLET | Refills: 5 | Status: SHIPPED | OUTPATIENT
Start: 2025-04-03

## 2025-04-23 ENCOUNTER — TELEPHONE (OUTPATIENT)
Age: 65
End: 2025-04-23

## 2025-04-23 NOTE — TELEPHONE ENCOUNTER
RE:Tegretol 200 mg tablet    Prior authorization approved     Authorized from January 1, 2025 to April 23, 2026     Approval Details located in the referral tab    Approval letter uploaded to media     FYI to nurse

## 2025-06-19 ENCOUNTER — CLINICAL DOCUMENTATION (OUTPATIENT)
Age: 65
End: 2025-06-19

## 2025-07-07 ENCOUNTER — OFFICE VISIT (OUTPATIENT)
Age: 65
End: 2025-07-07
Payer: MEDICARE

## 2025-07-07 VITALS
HEART RATE: 76 BPM | HEIGHT: 63 IN | OXYGEN SATURATION: 99 % | DIASTOLIC BLOOD PRESSURE: 80 MMHG | SYSTOLIC BLOOD PRESSURE: 122 MMHG | WEIGHT: 145.6 LBS | BODY MASS INDEX: 25.8 KG/M2 | RESPIRATION RATE: 18 BRPM

## 2025-07-07 DIAGNOSIS — R29.6 FALLS FREQUENTLY: ICD-10-CM

## 2025-07-07 DIAGNOSIS — K11.7 DROOLING: ICD-10-CM

## 2025-07-07 DIAGNOSIS — G40.009 LOCALIZATION-RELATED (FOCAL) (PARTIAL) IDIOPATHIC EPILEPSY AND EPILEPTIC SYNDROMES WITH SEIZURES OF LOCALIZED ONSET, NOT INTRACTABLE, WITHOUT STATUS EPILEPTICUS (HCC): Primary | ICD-10-CM

## 2025-07-07 PROCEDURE — 3017F COLORECTAL CA SCREEN DOC REV: CPT | Performed by: NURSE PRACTITIONER

## 2025-07-07 PROCEDURE — 99214 OFFICE O/P EST MOD 30 MIN: CPT | Performed by: NURSE PRACTITIONER

## 2025-07-07 PROCEDURE — G8419 CALC BMI OUT NRM PARAM NOF/U: HCPCS | Performed by: NURSE PRACTITIONER

## 2025-07-07 PROCEDURE — 1036F TOBACCO NON-USER: CPT | Performed by: NURSE PRACTITIONER

## 2025-07-07 PROCEDURE — G8427 DOCREV CUR MEDS BY ELIG CLIN: HCPCS | Performed by: NURSE PRACTITIONER

## 2025-07-07 RX ORDER — GLYCOPYRROLATE 1 MG/1
1 TABLET ORAL 2 TIMES DAILY
Qty: 60 TABLET | Refills: 5 | Status: SHIPPED | OUTPATIENT
Start: 2025-07-07

## 2025-07-07 ASSESSMENT — PATIENT HEALTH QUESTIONNAIRE - PHQ9
SUM OF ALL RESPONSES TO PHQ QUESTIONS 1-9: 0
2. FEELING DOWN, DEPRESSED OR HOPELESS: NOT AT ALL
1. LITTLE INTEREST OR PLEASURE IN DOING THINGS: NOT AT ALL

## 2025-07-07 NOTE — PROGRESS NOTES
Chief Complaint   Patient presents with    Seizures     Denies seizures since last OV      1. Have you been to the ER, urgent care clinic since your last visit?  Hospitalized since your last visit? No     2. Have you seen or consulted any other health care providers outside of the [facility] since your last visit?  Include any pap smears or colon screening.  No

## 2025-07-07 NOTE — PROGRESS NOTES
[facility]  [address]  [address]  [place] [address]  Tel: [phone]  Fax: [phone]      Date:  25     Name:  DEANNA PARKER  :  1960  MRN:  [id number]     PCP:  Prem England Jr., MD    Chief Complaint   Patient presents with    Seizures     Denies seizures since last OV        HISTORY OF PRESENT ILLNESS:  History of Present Illness  The patient is a 65-year-old female here today for a regular follow-up appointment. She was last seen in 2025. She has a notable history of epilepsy and reports no recent seizures. She is on a regimen of carbamazepine 400 mg three times a day and primidone 250 mg three times a day, with an additional 50 mg of primidone twice a day. At her last visit, she was experiencing some issues with drooling, for which glycopyrrolate was prescribed. She also has a history of multiple falls and dizziness.  She is here today with a caregiver from [facility], patient does live in a residential facility.  They assist with medication administration as well as caregiving as indicated.  Patient does do some cooking otherwise her meals are provided.  There are no safety or behavior concerns at this time.    She reports no new health issues and confirms adherence to her medication regimen. No recent seizures have occurred, and she reports no side effects from her medications. There have been no recent falls or episodes of dizziness. She does not use a cane for mobility. She reports no pain or headaches and states that her vision is good with the use of glasses. She has experienced dizziness in the past but acknowledges the need to increase her water intake. She is right-handed but is learning to use her left hand for eating and brushing her teeth. Her last blood work was conducted three weeks ago by Dr. Donis, who is retiring. Her new primary care physician will be Dr. England, whom she will start seeing in 2026.    The